# Patient Record
Sex: FEMALE | Race: WHITE | Employment: UNEMPLOYED | URBAN - METROPOLITAN AREA
[De-identification: names, ages, dates, MRNs, and addresses within clinical notes are randomized per-mention and may not be internally consistent; named-entity substitution may affect disease eponyms.]

---

## 2022-12-03 ENCOUNTER — APPOINTMENT (OUTPATIENT)
Dept: GENERAL RADIOLOGY | Age: 22
End: 2022-12-03
Attending: NURSE PRACTITIONER
Payer: MEDICAID

## 2022-12-03 ENCOUNTER — HOSPITAL ENCOUNTER (EMERGENCY)
Age: 22
Discharge: OTHER INSTITUTION | End: 2022-12-03
Attending: EMERGENCY MEDICINE
Payer: MEDICAID

## 2022-12-03 VITALS
WEIGHT: 109 LBS | TEMPERATURE: 98 F | DIASTOLIC BLOOD PRESSURE: 81 MMHG | HEART RATE: 86 BPM | RESPIRATION RATE: 16 BRPM | OXYGEN SATURATION: 96 % | SYSTOLIC BLOOD PRESSURE: 135 MMHG

## 2022-12-03 DIAGNOSIS — F31.9 BIPOLAR 1 DISORDER (HCC): Primary | ICD-10-CM

## 2022-12-03 LAB
ALBUMIN SERPL-MCNC: 4.1 G/DL (ref 3.5–5)
ALBUMIN/GLOB SERPL: 1.3 {RATIO} (ref 1.1–2.2)
ALP SERPL-CCNC: 63 U/L (ref 45–117)
ALT SERPL-CCNC: 18 U/L (ref 12–78)
AMPHET UR QL SCN: NEGATIVE
ANION GAP SERPL CALC-SCNC: 12 MMOL/L (ref 5–15)
APPEARANCE UR: CLEAR
AST SERPL-CCNC: 15 U/L (ref 15–37)
BACTERIA URNS QL MICRO: NEGATIVE /HPF
BARBITURATES UR QL SCN: NEGATIVE
BASOPHILS # BLD: 0.1 K/UL (ref 0–0.1)
BASOPHILS NFR BLD: 0 % (ref 0–1)
BENZODIAZ UR QL: NEGATIVE
BILIRUB SERPL-MCNC: 0.5 MG/DL (ref 0.2–1)
BILIRUB UR QL: NEGATIVE
BUN SERPL-MCNC: 9 MG/DL (ref 6–20)
BUN/CREAT SERPL: 14 (ref 12–20)
CALCIUM SERPL-MCNC: 9 MG/DL (ref 8.5–10.1)
CANNABINOIDS UR QL SCN: POSITIVE
CHLORIDE SERPL-SCNC: 101 MMOL/L (ref 97–108)
CO2 SERPL-SCNC: 28 MMOL/L (ref 21–32)
COCAINE UR QL SCN: NEGATIVE
COLOR UR: ABNORMAL
CREAT SERPL-MCNC: 0.63 MG/DL (ref 0.55–1.02)
DIFFERENTIAL METHOD BLD: ABNORMAL
DRUG SCRN COMMENT,DRGCM: ABNORMAL
EOSINOPHIL # BLD: 0 K/UL (ref 0–0.4)
EOSINOPHIL NFR BLD: 0 % (ref 0–7)
EPITH CASTS URNS QL MICRO: ABNORMAL /LPF
ERYTHROCYTE [DISTWIDTH] IN BLOOD BY AUTOMATED COUNT: 13.2 % (ref 11.5–14.5)
ETHANOL SERPL-MCNC: <10 MG/DL
FLUAV RNA SPEC QL NAA+PROBE: NOT DETECTED
FLUBV RNA SPEC QL NAA+PROBE: NOT DETECTED
GLOBULIN SER CALC-MCNC: 3.2 G/DL (ref 2–4)
GLUCOSE SERPL-MCNC: 91 MG/DL (ref 65–100)
GLUCOSE UR STRIP.AUTO-MCNC: NEGATIVE MG/DL
HCG UR QL: NEGATIVE
HCT VFR BLD AUTO: 36.8 % (ref 35–47)
HGB BLD-MCNC: 13 G/DL (ref 11.5–16)
HGB UR QL STRIP: NEGATIVE
IMM GRANULOCYTES # BLD AUTO: 0 K/UL (ref 0–0.04)
IMM GRANULOCYTES NFR BLD AUTO: 0 % (ref 0–0.5)
KETONES UR QL STRIP.AUTO: >80 MG/DL
LEUKOCYTE ESTERASE UR QL STRIP.AUTO: NEGATIVE
LYMPHOCYTES # BLD: 1.9 K/UL (ref 0.8–3.5)
LYMPHOCYTES NFR BLD: 16 % (ref 12–49)
MCH RBC QN AUTO: 31.5 PG (ref 26–34)
MCHC RBC AUTO-ENTMCNC: 35.3 G/DL (ref 30–36.5)
MCV RBC AUTO: 89.1 FL (ref 80–99)
METHADONE UR QL: NEGATIVE
MONOCYTES # BLD: 0.7 K/UL (ref 0–1)
MONOCYTES NFR BLD: 6 % (ref 5–13)
MUCOUS THREADS URNS QL MICRO: ABNORMAL /LPF
NEUTS SEG # BLD: 8.9 K/UL (ref 1.8–8)
NEUTS SEG NFR BLD: 78 % (ref 32–75)
NITRITE UR QL STRIP.AUTO: NEGATIVE
NRBC # BLD: 0 K/UL (ref 0–0.01)
NRBC BLD-RTO: 0 PER 100 WBC
OPIATES UR QL: NEGATIVE
PCP UR QL: NEGATIVE
PH UR STRIP: 5.5 [PH] (ref 5–8)
PLATELET # BLD AUTO: 332 K/UL (ref 150–400)
PMV BLD AUTO: 10.2 FL (ref 8.9–12.9)
POTASSIUM SERPL-SCNC: 3 MMOL/L (ref 3.5–5.1)
PROT SERPL-MCNC: 7.3 G/DL (ref 6.4–8.2)
PROT UR STRIP-MCNC: ABNORMAL MG/DL
RBC # BLD AUTO: 4.13 M/UL (ref 3.8–5.2)
RBC #/AREA URNS HPF: ABNORMAL /HPF (ref 0–5)
SARS-COV-2, COV2: NOT DETECTED
SODIUM SERPL-SCNC: 141 MMOL/L (ref 136–145)
SP GR UR REFRACTOMETRY: 1.02
UA: UC IF INDICATED,UAUC: ABNORMAL
UROBILINOGEN UR QL STRIP.AUTO: 1 EU/DL (ref 0.2–1)
WBC # BLD AUTO: 11.7 K/UL (ref 3.6–11)
WBC URNS QL MICRO: ABNORMAL /HPF (ref 0–4)

## 2022-12-03 PROCEDURE — 99284 EMERGENCY DEPT VISIT MOD MDM: CPT

## 2022-12-03 PROCEDURE — 85025 COMPLETE CBC W/AUTO DIFF WBC: CPT

## 2022-12-03 PROCEDURE — 36415 COLL VENOUS BLD VENIPUNCTURE: CPT

## 2022-12-03 PROCEDURE — 80053 COMPREHEN METABOLIC PANEL: CPT

## 2022-12-03 PROCEDURE — 81001 URINALYSIS AUTO W/SCOPE: CPT

## 2022-12-03 PROCEDURE — 87636 SARSCOV2 & INF A&B AMP PRB: CPT

## 2022-12-03 PROCEDURE — 74011250637 HC RX REV CODE- 250/637: Performed by: EMERGENCY MEDICINE

## 2022-12-03 PROCEDURE — 82077 ASSAY SPEC XCP UR&BREATH IA: CPT

## 2022-12-03 PROCEDURE — 80307 DRUG TEST PRSMV CHEM ANLYZR: CPT

## 2022-12-03 PROCEDURE — 74018 RADEX ABDOMEN 1 VIEW: CPT

## 2022-12-03 PROCEDURE — 81025 URINE PREGNANCY TEST: CPT

## 2022-12-03 RX ORDER — POTASSIUM CHLORIDE 750 MG/1
40 TABLET, FILM COATED, EXTENDED RELEASE ORAL
Status: DISCONTINUED | OUTPATIENT
Start: 2022-12-03 | End: 2022-12-04 | Stop reason: HOSPADM

## 2022-12-03 NOTE — ED NOTES
Pt presents to ED ambulatory under ECO accompanied by [place] police complaining of mental health problem. Pt denies SI/HI or AV hallucinations. Pt reports she does not know why she is here. Per CTC, pt was recently evaluated at Crisis who recommended pt go to [facility] for admission to [facility]. Pt cooperative and went to [facility] for eval but was voluntary and refused admission. Pt's family petitioned for ECO due to pt exhibiting paranoid and bizarre behavior. Per family, pt has not been sleeping for the past few days and paces around at all hours. Pt noted to have abrasions to bilateral arms, pt reports she got the scratches from her cat. Pt has hx of cutting but denies any self- injurious behavior at this time. Pt cooperative with blood draw. Pt is alert and oriented x 4, RR even and unlabored. Assessment completed and pt updated on plan of care. Call bell in reach. Emergency Department Nursing Plan of Care       The Nursing Plan of Care is developed from the Nursing assessment and Emergency Department Attending provider initial evaluation. The plan of care may be reviewed in the ED Provider note.     The Plan of Care was developed with the following considerations:   Patient / Family readiness to learn indicated by:verbalized understanding  Persons(s) to be included in education: patient  Barriers to Learning/Limitations:No    Signed     Hamzah Nunez    12/3/2022   6:24 PM

## 2022-12-03 NOTE — ED TRIAGE NOTES
Patient presents to ED in police custody under an ECO with c/o mental health crisis. [place] police sates that patient mother petitioned for a paper ECO stating that patient has not been sleeping, delusional and paranoid and standing by a neighbors car last night while neighbor was inside and just stared at [employer]. Denies SI/HI/Hallucination. Patient withdrawn when asking questions. Hx of cutting.

## 2022-12-03 NOTE — ED NOTES
Pt arrived in bilateral forensic restraints secured in front. Metal restraints sign placed on door and Micha Amaya, nursing supervisor, aware.

## 2022-12-04 NOTE — ED PROVIDER NOTES
EMERGENCY DEPARTMENT HISTORY AND PHYSICAL EXAM          Date: 12/3/2022  Patient Name: Nara Stevens    History of Presenting Illness     Chief Complaint   Patient presents with    Mental Health Problem         History Provided By: Patient    Chief Complaint: mental health   Duration:onset yesterday   Timing:  Acute  Quality: patient has been agitated   Severity: Severe  Modifying Factors: none  Associated Symptoms:  paranoid      HPI: Nara Stevens is a 25 y.o. female with a PMH of  bipolar disorder  who presents with mental health problem onset yesterday. Per parents patient has been paranoid walking outside staring into peoples cars. Patient was evaluated yesterday at [facility] and discharged. Parents today stated patient was getting worse and went to the [facility] to have an E CO issued. Patient arrived with [facility] police handcuffs and back under E CO patient stated that she knew she was here for evaluation. States she has a cut on her right wrist but her cat scratched her. Patient states she has been hearing voices \"a little bit\". She states she takes citalopram.  She also states she was recently ill and had just completed a [facility]-[employer]. States she was looking for a psychiatrist.  Patient has a vague stare and will focus on 1 object in the room and stare at it. Patient also extended her left arm and noted bruise on her arm she pulled down her pants and revealed a bruise on her left buttock. Patient states she was taking decorations out of the attic when she fell. She also states she has abdominal pain. PCP: None    Current Facility-Administered Medications   Medication Dose Route Frequency Provider Last Rate Last Admin    potassium chloride SR (KLOR-CON 10) tablet 40 mEq  40 mEq Oral NOW Kelley Khan NP           Past History     Past Medical History:  History reviewed. No pertinent past medical history. Past Surgical History:  History reviewed.  No pertinent surgical history. Family History:  History reviewed. No pertinent family history. Social History:  Social History     Tobacco Use    Smoking status: Never    Smokeless tobacco: Never   Substance Use Topics    Alcohol use: Never    Drug use: Never       Allergies:  No Known Allergies      Review of Systems   Review of Systems   Constitutional:  Negative for fatigue and fever. Respiratory:  Negative for shortness of breath and wheezing. Cardiovascular:  Negative for chest pain. Gastrointestinal:  Negative for abdominal pain. Musculoskeletal:  Negative for arthralgias and neck pain. Skin:  Negative for rash. Abrasion R wrist   Neurological:  Negative for dizziness and headaches. All other systems reviewed and are negative. Physical Exam     Vitals:    12/03/22 1642 12/03/22 1711   BP: 135/81    Pulse: 86    Resp: 16    Temp:  98 °F (36.7 °C)   SpO2: 96%    Weight:  49.4 kg (109 lb)     Physical Exam  Vitals and nursing note reviewed. Constitutional:       General: She is not in acute distress. Appearance: Normal appearance. She is well-developed. HENT:      Head: Normocephalic and atraumatic. Right Ear: External ear normal.      Left Ear: External ear normal.      Nose: Nose normal.   Eyes:      Conjunctiva/sclera: Conjunctivae normal.   Cardiovascular:      Rate and Rhythm: Normal rate and regular rhythm. Heart sounds: Normal heart sounds. Pulmonary:      Effort: Pulmonary effort is normal. No respiratory distress. Breath sounds: Normal breath sounds. No wheezing. Abdominal:      General: Bowel sounds are normal.      Palpations: Abdomen is soft. Tenderness: There is no abdominal tenderness. Musculoskeletal:         General: Normal range of motion. Cervical back: Normal range of motion and neck supple. Lymphadenopathy:      Cervical: No cervical adenopathy. Skin:     General: Skin is warm and dry. Findings: No rash.       Comments: Bruise on left buttock bruise on anterior aspect of left forearm abrasion of right wrist dorsal aspect of right hand erythema (patient states that she was hitting a punching bag without a glove)   Neurological:      Mental Status: She is alert and oriented to person, place, and time. Cranial Nerves: No cranial nerve deficit. Coordination: Coordination normal.   Psychiatric:         Mood and Affect: Mood is depressed. Speech: Speech is delayed. Behavior: Behavior is agitated. Thought Content: Thought content is paranoid. Thought content does not include homicidal or suicidal ideation. Thought content does not include homicidal or suicidal plan. Judgment: Judgment is impulsive. Medical Decision Making   I am the first provider for this patient. I reviewed the vital signs, available nursing notes, past medical history, past surgical history, family history and social history. Vital Signs-Reviewed the patient's vital signs. Records Reviewed: Nursing Notes    Provider Notes (Medical Decision Making):   70-year-old female presents under E CO agitation at home not acting herself parents went to the [facility] to obtain an E CO will order labs per protocol crisis evaluation and admission TDO  DDX disorder acute psychoses substance-induced mood disorder  ED Course as of 12/04/22 1346   Sat Dec 03, 2022   2100 Care of patient transferred to Dr Juan Mcnair [AN]   [phone] Patient is medically cleared. [AN]   [phone] Transfer Note:   Patient is being transferred to [facility]. Transfer was accepted by Dr. Skyla Romero. The reasons for their transfer have been discussed with them and available family. They convey agreement and understanding for the need to be transferred as explained to them by me.   Nicho Lemos MD     [HW]      ED Course User Index  [AN] Darline Fernando NP  [HW] Lyndsey Elena MD            Procedures:  Procedures    Diagnostic Study Results     Labs - Recent Results (from the past 12 hour(s))   CBC WITH AUTOMATED DIFF    Collection Time: 12/03/22  4:58 PM   Result Value Ref Range    WBC 11.7 (H) 3.6 - 11.0 K/uL    RBC 4.13 3.80 - 5.20 M/uL    HGB 13.0 11.5 - 16.0 g/dL    HCT 36.8 35.0 - 47.0 %    MCV 89.1 80.0 - 99.0 FL    MCH 31.5 26.0 - 34.0 PG    MCHC 35.3 30.0 - 36.5 g/dL    RDW 13.2 11.5 - 14.5 %    PLATELET 752 760 - 823 K/uL    MPV 10.2 8.9 - 12.9 FL    NRBC 0.0 0  WBC    ABSOLUTE NRBC 0.00 0.00 - 0.01 K/uL    NEUTROPHILS 78 (H) 32 - 75 %    LYMPHOCYTES 16 12 - 49 %    MONOCYTES 6 5 - 13 %    EOSINOPHILS 0 0 - 7 %    BASOPHILS 0 0 - 1 %    IMMATURE GRANULOCYTES 0 0.0 - 0.5 %    ABS. NEUTROPHILS 8.9 (H) 1.8 - 8.0 K/UL    ABS. LYMPHOCYTES 1.9 0.8 - 3.5 K/UL    ABS. MONOCYTES 0.7 0.0 - 1.0 K/UL    ABS. EOSINOPHILS 0.0 0.0 - 0.4 K/UL    ABS. BASOPHILS 0.1 0.0 - 0.1 K/UL    ABS. IMM. GRANS. 0.0 0.00 - 0.04 K/UL    DF AUTOMATED     METABOLIC PANEL, COMPREHENSIVE    Collection Time: 12/03/22  4:58 PM   Result Value Ref Range    Sodium 141 136 - 145 mmol/L    Potassium 3.0 (L) 3.5 - 5.1 mmol/L    Chloride 101 97 - 108 mmol/L    CO2 28 21 - 32 mmol/L    Anion gap 12 5 - 15 mmol/L    Glucose 91 65 - 100 mg/dL    BUN 9 6 - 20 MG/DL    Creatinine 0.63 0.55 - 1.02 MG/DL    BUN/Creatinine ratio 14 12 - 20      eGFR >60 >60 ml/min/1.73m2    Calcium 9.0 8.5 - 10.1 MG/DL    Bilirubin, total 0.5 0.2 - 1.0 MG/DL    ALT (SGPT) 18 12 - 78 U/L    AST (SGOT) 15 15 - 37 U/L    Alk.  phosphatase 63 45 - 117 U/L    Protein, total 7.3 6.4 - 8.2 g/dL    Albumin 4.1 3.5 - 5.0 g/dL    Globulin 3.2 2.0 - 4.0 g/dL    A-G Ratio 1.3 1.1 - 2.2     ETHYL ALCOHOL    Collection Time: 12/03/22  4:58 PM   Result Value Ref Range    ALCOHOL(ETHYL),SERUM <10 <10 MG/DL   COVID-19 WITH INFLUENZA A/B    Collection Time: 12/03/22  4:58 PM   Result Value Ref Range    SARS-CoV-2 by PCR Not detected NOTD      Influenza A by PCR Not detected      Influenza B by PCR Not detected     DRUG SCREEN, URINE    Collection Time: 12/03/22  6:18 PM   Result Value Ref Range    AMPHETAMINES Negative NEG      BARBITURATES Negative NEG      BENZODIAZEPINES Negative NEG      COCAINE Negative NEG      METHADONE Negative NEG      OPIATES Negative NEG      PCP(PHENCYCLIDINE) Negative NEG      THC (TH-CANNABINOL) Positive (A) NEG      Drug screen comment (NOTE)    URINALYSIS W/ REFLEX CULTURE    Collection Time: 12/03/22  6:18 PM    Specimen: Urine   Result Value Ref Range    Color DARK YELLOW      Appearance CLEAR CLEAR      Specific gravity 1.025      pH (UA) 5.5 5.0 - 8.0      Protein TRACE (A) NEG mg/dL    Glucose Negative NEG mg/dL    Ketone >80 (A) NEG mg/dL    Bilirubin Negative NEG      Blood Negative NEG      Urobilinogen 1.0 0.2 - 1.0 EU/dL    Nitrites Negative NEG      Leukocyte Esterase Negative NEG      WBC 0-4 0 - 4 /hpf    RBC 0-5 0 - 5 /hpf    Epithelial cells FEW FEW /lpf    Bacteria Negative NEG /hpf    UA:UC IF INDICATED CULTURE NOT INDICATED BY UA RESULT CNI      Mucus 2+ (A) NEG /lpf   HCG URINE, QL. - POC    Collection Time: 12/03/22  6:22 PM   Result Value Ref Range    Pregnancy test,urine (POC) Negative NEG         Radiologic Studies -   XR ABD (KUB)   Final Result   No evidence of bowel obstruction. CT Results  (Last 48 hours)      None          CXR Results  (Last 48 hours)      None                  Please note that this dictation was completed with Dragon, computer voice recognition software. Quite often unanticipated grammatical, syntax, homophones, and other interpretive errors are inadvertently transcribed by the computer software. Please disregard these errors. Additionally, please excuse any errors that have escaped final proofreading. Diagnosis     Clinical Impression:   1.  Bipolar 1 disorder ([facility] [address]

## 2022-12-04 NOTE — ED NOTES
Pt left ED ambulatory accompanied by police under TDO en route to [facility].

## 2022-12-04 NOTE — ED NOTES
Attempted to administer PO potassium, but patient refusing. Patient provided medications after she verbally agreed to take medications, but immediately put them on bedside table. When asked if she is going to take them, she pushed pills away, but shook head \"yes. \" After 30 minutes, patient has still not taken meds. Will continue to encourage patient to take medications.

## 2022-12-04 NOTE — ED NOTES
TRANSFER - OUT REPORT:    Verbal report given to Vanna Rainey RN (name) on Frances Reinoso  being transferred to [facility] (unit) at [facility] for routine progression of care       Report consisted of patients Situation, Background, Assessment and   Recommendations(SBAR). Information from the following report(s) SBAR, ED Summary, Procedure Summary, MAR and Recent Results was reviewed with the receiving nurse. Lines:       Opportunity for questions and clarification was provided.       Patient transported with:   Police

## 2022-12-05 ENCOUNTER — HOSPITAL ENCOUNTER (EMERGENCY)
Age: 22
Discharge: STILL ADMITTED OR ADMINISTRATIVE | End: 2022-12-05

## 2023-05-25 RX ORDER — TOPIRAMATE 25 MG/1
TABLET ORAL 2 TIMES DAILY
COMMUNITY

## 2023-05-25 RX ORDER — QUETIAPINE 150 MG/1
TABLET, FILM COATED, EXTENDED RELEASE ORAL
COMMUNITY

## 2023-05-25 RX ORDER — NORETHINDRONE ACETATE AND ETHINYL ESTRADIOL 1.5-30(21)
1 KIT ORAL DAILY
COMMUNITY
Start: 2015-12-07

## 2025-05-15 ENCOUNTER — HOSPITAL ENCOUNTER (INPATIENT)
Facility: HOSPITAL | Age: 25
LOS: 6 days | Discharge: HOME | DRG: 750 | End: 2025-05-22
Attending: PSYCHIATRY & NEUROLOGY | Admitting: PSYCHIATRY & NEUROLOGY
Payer: MEDICAID

## 2025-05-15 DIAGNOSIS — R45.851 SUICIDAL IDEATION: Primary | ICD-10-CM

## 2025-05-15 LAB
ALBUMIN SERPL-MCNC: 4 G/DL (ref 3.5–5)
ALBUMIN/GLOB SERPL: 1 (ref 1.1–2.2)
ALP SERPL-CCNC: 68 U/L (ref 45–117)
ALT SERPL-CCNC: 24 U/L (ref 12–78)
ANION GAP SERPL CALC-SCNC: 6 MMOL/L (ref 2–12)
APAP SERPL-MCNC: <2 UG/ML (ref 10–30)
AST SERPL W P-5'-P-CCNC: 16 U/L (ref 15–37)
BASOPHILS # BLD: 0.02 K/UL (ref 0–0.1)
BASOPHILS NFR BLD: 0.3 % (ref 0–1)
BILIRUB SERPL-MCNC: 0.5 MG/DL (ref 0.2–1)
BUN SERPL-MCNC: 11 MG/DL (ref 6–20)
BUN/CREAT SERPL: 16 (ref 12–20)
CA-I BLD-MCNC: 9.2 MG/DL (ref 8.5–10.1)
CHLORIDE SERPL-SCNC: 104 MMOL/L (ref 97–108)
CO2 SERPL-SCNC: 25 MMOL/L (ref 21–32)
CREAT SERPL-MCNC: 0.67 MG/DL (ref 0.55–1.02)
DATE LAST DOSE: ABNORMAL
DATE LAST DOSE: ABNORMAL
DIFFERENTIAL METHOD BLD: NORMAL
DOSE AMOUNT: ABNORMAL UNITS
DOSE AMOUNT: ABNORMAL UNITS
EOSINOPHIL # BLD: 0.04 K/UL (ref 0–0.4)
EOSINOPHIL NFR BLD: 0.6 % (ref 0–7)
ERYTHROCYTE [DISTWIDTH] IN BLOOD BY AUTOMATED COUNT: 11.6 % (ref 11.5–14.5)
ETHANOL SERPL-MCNC: <10 MG/DL (ref 0–0.08)
FLUAV RNA SPEC QL NAA+PROBE: NOT DETECTED
FLUBV RNA SPEC QL NAA+PROBE: NOT DETECTED
GLOBULIN SER CALC-MCNC: 3.9 G/DL (ref 2–4)
GLUCOSE SERPL-MCNC: 98 MG/DL (ref 65–100)
HCT VFR BLD AUTO: 36.1 % (ref 35–47)
HGB BLD-MCNC: 13.1 G/DL (ref 11.5–16)
IMM GRANULOCYTES # BLD AUTO: 0.02 K/UL (ref 0–0.04)
IMM GRANULOCYTES NFR BLD AUTO: 0.3 % (ref 0–0.5)
LYMPHOCYTES # BLD: 1.8 K/UL (ref 0.8–3.5)
LYMPHOCYTES NFR BLD: 25 % (ref 12–49)
MCH RBC QN AUTO: 32.6 PG (ref 26–34)
MCHC RBC AUTO-ENTMCNC: 36.3 G/DL (ref 30–36.5)
MCV RBC AUTO: 89.8 FL (ref 80–99)
MONOCYTES # BLD: 0.49 K/UL (ref 0–1)
MONOCYTES NFR BLD: 6.8 % (ref 5–13)
NEUTS SEG # BLD: 4.83 K/UL (ref 1.8–8)
NEUTS SEG NFR BLD: 67 % (ref 32–75)
NRBC # BLD: 0 K/UL (ref 0–0.01)
NRBC BLD-RTO: 0 PER 100 WBC
PLATELET # BLD AUTO: 275 K/UL (ref 150–400)
PMV BLD AUTO: 10.1 FL (ref 8.9–12.9)
POTASSIUM SERPL-SCNC: 3.3 MMOL/L (ref 3.5–5.1)
PROT SERPL-MCNC: 7.9 G/DL (ref 6.4–8.2)
RBC # BLD AUTO: 4.02 M/UL (ref 3.8–5.2)
SALICYLATES SERPL-MCNC: <1.7 MG/DL (ref 2.8–20)
SARS-COV-2 RNA RESP QL NAA+PROBE: NOT DETECTED
SODIUM SERPL-SCNC: 135 MMOL/L (ref 136–145)
WBC # BLD AUTO: 7.2 K/UL (ref 3.6–11)

## 2025-05-15 PROCEDURE — 85025 COMPLETE CBC W/AUTO DIFF WBC: CPT

## 2025-05-15 PROCEDURE — 99285 EMERGENCY DEPT VISIT HI MDM: CPT

## 2025-05-15 PROCEDURE — 82077 ASSAY SPEC XCP UR&BREATH IA: CPT

## 2025-05-15 PROCEDURE — 80143 DRUG ASSAY ACETAMINOPHEN: CPT

## 2025-05-15 PROCEDURE — 80053 COMPREHEN METABOLIC PANEL: CPT

## 2025-05-15 PROCEDURE — 36415 COLL VENOUS BLD VENIPUNCTURE: CPT

## 2025-05-15 PROCEDURE — 87636 SARSCOV2 & INF A&B AMP PRB: CPT

## 2025-05-15 PROCEDURE — 80179 DRUG ASSAY SALICYLATE: CPT

## 2025-05-15 ASSESSMENT — PAIN SCALES - GENERAL
PAINLEVEL_OUTOF10: 10
PAINLEVEL_OUTOF10: 0

## 2025-05-15 ASSESSMENT — PAIN DESCRIPTION - LOCATION: LOCATION: CHEST;HEAD

## 2025-05-15 ASSESSMENT — PAIN - FUNCTIONAL ASSESSMENT: PAIN_FUNCTIONAL_ASSESSMENT: 0-10

## 2025-05-16 PROBLEM — F29 PSYCHOSIS, UNSPECIFIED PSYCHOSIS TYPE (HCC): Status: ACTIVE | Noted: 2025-05-16

## 2025-05-16 PROBLEM — F33.9 MAJOR DEPRESSION, RECURRENT: Status: ACTIVE | Noted: 2025-05-16

## 2025-05-16 LAB
AMPHET UR QL SCN: POSITIVE
APPEARANCE UR: ABNORMAL
BACTERIA URNS QL MICRO: ABNORMAL /HPF
BARBITURATES UR QL SCN: NEGATIVE
BENZODIAZ UR QL: NEGATIVE
BILIRUB UR QL: ABNORMAL
CANNABINOIDS UR QL SCN: NEGATIVE
COCAINE UR QL SCN: NEGATIVE
COLOR UR: ABNORMAL
EPITH CASTS URNS QL MICRO: ABNORMAL /LPF
GLUCOSE UR STRIP.AUTO-MCNC: NEGATIVE MG/DL
HGB UR QL STRIP: ABNORMAL
KETONES UR QL STRIP.AUTO: 40 MG/DL
LEUKOCYTE ESTERASE UR QL STRIP.AUTO: ABNORMAL
Lab: ABNORMAL
METHADONE UR QL: NEGATIVE
MUCOUS THREADS URNS QL MICRO: ABNORMAL /LPF
NITRITE UR QL STRIP.AUTO: NEGATIVE
OPIATES UR QL: NEGATIVE
PCP UR QL: NEGATIVE
PH UR STRIP: 5 (ref 5–8)
PROT UR STRIP-MCNC: 30 MG/DL
RBC #/AREA URNS HPF: >100 /HPF (ref 0–5)
UROBILINOGEN UR QL STRIP.AUTO: NEGATIVE EU/DL (ref 0.1–1)
WBC URNS QL MICRO: ABNORMAL /HPF (ref 0–4)

## 2025-05-16 PROCEDURE — 6360000002 HC RX W HCPCS

## 2025-05-16 PROCEDURE — 6370000000 HC RX 637 (ALT 250 FOR IP)

## 2025-05-16 PROCEDURE — 96372 THER/PROPH/DIAG INJ SC/IM: CPT

## 2025-05-16 PROCEDURE — 80307 DRUG TEST PRSMV CHEM ANLYZR: CPT

## 2025-05-16 PROCEDURE — 1140000000 HC RM PRIVATE PSYCH

## 2025-05-16 PROCEDURE — 6370000000 HC RX 637 (ALT 250 FOR IP): Performed by: PSYCHIATRY & NEUROLOGY

## 2025-05-16 PROCEDURE — 81001 URINALYSIS AUTO W/SCOPE: CPT

## 2025-05-16 PROCEDURE — 90792 PSYCH DIAG EVAL W/MED SRVCS: CPT | Performed by: NURSE PRACTITIONER

## 2025-05-16 RX ORDER — QUETIAPINE FUMARATE 50 MG/1
150 TABLET, EXTENDED RELEASE ORAL DAILY
Status: DISCONTINUED | OUTPATIENT
Start: 2025-05-16 | End: 2025-05-18

## 2025-05-16 RX ORDER — ZIPRASIDONE MESYLATE 20 MG/ML
20 INJECTION, POWDER, LYOPHILIZED, FOR SOLUTION INTRAMUSCULAR EVERY 12 HOURS PRN
Status: DISCONTINUED | OUTPATIENT
Start: 2025-05-16 | End: 2025-05-22 | Stop reason: HOSPADM

## 2025-05-16 RX ORDER — ZIPRASIDONE MESYLATE 20 MG/ML
INJECTION, POWDER, LYOPHILIZED, FOR SOLUTION INTRAMUSCULAR
Status: COMPLETED
Start: 2025-05-16 | End: 2025-05-16

## 2025-05-16 RX ORDER — LORAZEPAM 1 MG/1
1 TABLET ORAL EVERY 6 HOURS PRN
Status: DISCONTINUED | OUTPATIENT
Start: 2025-05-16 | End: 2025-05-22 | Stop reason: HOSPADM

## 2025-05-16 RX ORDER — HYDROXYZINE HYDROCHLORIDE 25 MG/1
50 TABLET, FILM COATED ORAL ONCE
Status: COMPLETED | OUTPATIENT
Start: 2025-05-16 | End: 2025-05-16

## 2025-05-16 RX ORDER — BUPROPION HYDROCHLORIDE 150 MG/1
150 TABLET ORAL EVERY MORNING
Status: DISCONTINUED | OUTPATIENT
Start: 2025-05-17 | End: 2025-05-22 | Stop reason: HOSPADM

## 2025-05-16 RX ORDER — MIRTAZAPINE 7.5 MG/1
7.5 TABLET, FILM COATED ORAL NIGHTLY
Status: ON HOLD | COMMUNITY
Start: 2025-04-17 | End: 2025-05-22

## 2025-05-16 RX ORDER — MIRTAZAPINE 15 MG/1
7.5 TABLET, FILM COATED ORAL NIGHTLY
Status: DISCONTINUED | OUTPATIENT
Start: 2025-05-16 | End: 2025-05-22 | Stop reason: HOSPADM

## 2025-05-16 RX ORDER — HYDROXYZINE HYDROCHLORIDE 50 MG/1
50 TABLET, FILM COATED ORAL 3 TIMES DAILY PRN
Status: DISCONTINUED | OUTPATIENT
Start: 2025-05-16 | End: 2025-05-22 | Stop reason: HOSPADM

## 2025-05-16 RX ORDER — TRAZODONE HYDROCHLORIDE 50 MG/1
50 TABLET ORAL NIGHTLY PRN
Status: DISCONTINUED | OUTPATIENT
Start: 2025-05-16 | End: 2025-05-22 | Stop reason: HOSPADM

## 2025-05-16 RX ORDER — LORAZEPAM 2 MG/ML
1 INJECTION INTRAMUSCULAR EVERY 6 HOURS PRN
Status: DISCONTINUED | OUTPATIENT
Start: 2025-05-16 | End: 2025-05-22 | Stop reason: HOSPADM

## 2025-05-16 RX ORDER — HYDROXYZINE HYDROCHLORIDE 25 MG/1
25 TABLET, FILM COATED ORAL EVERY 8 HOURS PRN
Status: ON HOLD | COMMUNITY
Start: 2025-04-17 | End: 2025-05-22 | Stop reason: HOSPADM

## 2025-05-16 RX ORDER — CITALOPRAM HYDROBROMIDE 20 MG/1
20 TABLET ORAL DAILY
Status: ON HOLD | COMMUNITY
End: 2025-05-22 | Stop reason: HOSPADM

## 2025-05-16 RX ORDER — ZIPRASIDONE MESYLATE 20 MG/ML
20 INJECTION, POWDER, LYOPHILIZED, FOR SOLUTION INTRAMUSCULAR ONCE
Status: COMPLETED | OUTPATIENT
Start: 2025-05-16 | End: 2025-05-16

## 2025-05-16 RX ORDER — ACETAMINOPHEN 325 MG/1
650 TABLET ORAL EVERY 4 HOURS PRN
Status: DISCONTINUED | OUTPATIENT
Start: 2025-05-16 | End: 2025-05-22 | Stop reason: HOSPADM

## 2025-05-16 RX ORDER — BUPROPION HYDROCHLORIDE 150 MG/1
150 TABLET ORAL EVERY MORNING
Status: ON HOLD | COMMUNITY
Start: 2025-04-17 | End: 2025-05-22 | Stop reason: HOSPADM

## 2025-05-16 RX ORDER — ZIPRASIDONE HYDROCHLORIDE 40 MG/1
40 CAPSULE ORAL 2 TIMES DAILY PRN
Status: DISCONTINUED | OUTPATIENT
Start: 2025-05-16 | End: 2025-05-22 | Stop reason: HOSPADM

## 2025-05-16 RX ADMIN — HYDROXYZINE HYDROCHLORIDE 50 MG: 25 TABLET, FILM COATED ORAL at 02:56

## 2025-05-16 RX ADMIN — QUETIAPINE FUMARATE 150 MG: 50 TABLET, EXTENDED RELEASE ORAL at 16:34

## 2025-05-16 RX ADMIN — ZIPRASIDONE MESYLATE 20 MG: 20 INJECTION, POWDER, LYOPHILIZED, FOR SOLUTION INTRAMUSCULAR at 05:02

## 2025-05-16 RX ADMIN — HYDROXYZINE HYDROCHLORIDE 50 MG: 50 TABLET, FILM COATED ORAL at 21:08

## 2025-05-16 RX ADMIN — MIRTAZAPINE 7.5 MG: 15 TABLET, FILM COATED ORAL at 21:07

## 2025-05-16 RX ADMIN — TRAZODONE HYDROCHLORIDE 50 MG: 50 TABLET ORAL at 21:08

## 2025-05-16 ASSESSMENT — LIFESTYLE VARIABLES
HOW OFTEN DO YOU HAVE A DRINK CONTAINING ALCOHOL: 4 OR MORE TIMES A WEEK
HOW MANY STANDARD DRINKS CONTAINING ALCOHOL DO YOU HAVE ON A TYPICAL DAY: 1 OR 2

## 2025-05-16 ASSESSMENT — SLEEP AND FATIGUE QUESTIONNAIRES
AVERAGE NUMBER OF SLEEP HOURS: 5
DO YOU HAVE DIFFICULTY SLEEPING: NO
DO YOU USE A SLEEP AID: YES

## 2025-05-16 ASSESSMENT — PAIN SCALES - GENERAL
PAINLEVEL_OUTOF10: 0
PAINLEVEL_OUTOF10: 0

## 2025-05-16 NOTE — ED PROVIDER NOTES
Tb24 extended release tablet  Commonly known as: SEROQUEL XR     topiramate 25 MG tablet  Commonly known as: TOPAMAX                DISCONTINUED MEDICATIONS:  Current Discharge Medication List          I am the Primary Clinician of Record. Tracey Caldera PA-C (electronically signed)    (Please note that parts of this dictation were completed with voice recognition software. Quite often unanticipated grammatical, syntax, homophones, and other interpretive errors are inadvertently transcribed by the computer software. Please disregards these errors. Please excuse any errors that have escaped final proofreading.)     Tracey Caldera PA-C  05/17/25 0045

## 2025-05-16 NOTE — ED NOTES
Pt agreeable with patient talking with Tamika, prior foster mother.  Tamika called at this time, phone number [phone]. Provided an update at this time. Informed Tamika that information should be relayed to significant other who is requesting an update. Writer requested medication list at this time. Tamika informed writer that patient has been taking Trazadone 100 mg tablet at bedtime which is a old medication that she had, however, was prescribed other medications that she did not like so did not take  Other medications include  Mirtazipine 7.5 mg tablet once at bedtime  Citalopram 20 mg once daily  Bupropion 150 mg once daily  Hydroxyzine 25 mg 3 times daily as needed for anxiety

## 2025-05-16 NOTE — BSMART NOTE
Pt presents under paperless ECO initiated by [place] Police. This writer contacted [place] [facility] for further instructions. Awaiting call back.

## 2025-05-16 NOTE — ED NOTES
Writer observed pt starring off in room and responding to internal stimuli. While medicating patient, pt seemed paranoid, stating \"Please don't change the medicine after I take it, I'm trusting you.\"    Pt has been verbally abusive towards some staff and PD, but cooperative and calm with writer.     Still trying to obtained urine sample at this time, pt has been escorted to bathroom multiple times with staff and PD.     Pt resting on stretcher, no distressed noted. PD cuff in place on pt's right wrist. Safety sitter and PD at door.

## 2025-05-16 NOTE — ED NOTES
Assumed care of patient. Report received from Kaykay SANON RN. Pt sitting in bed, talking to self.  and [place] PD present with line of sight of patient. Forensic restraint present to left wrist at this time.

## 2025-05-16 NOTE — VIRTUAL HEALTH
out the details of how to kill yourself? Do you intend to carry out this plan?  No   6) Have you ever done anything, started to do anything, or prepared to do anything to end your life? Yes   Did this occur within the past 3 months?  No    :    Protective Factors:  Protective: Age >25 and <55, Female gender, and Physically healthy  Risk Factors:  Risk Factors: Active suicidal ideation, Access to lethal means, Prior suicide attempt, Active substance abuse, Highly impulsive behaviors, Active psychosis, Medication noncompliance, and No collateral information to support safety      Overall Level Suicide Risk: TelePsych CSSRS Risk Level: Moderate Risk to high risk    Assessment:   Patient is a 25 y.o.  female who presents for suicidal ideation. Patient was exhibiting bizarre behavior and when police arrived she made a suicidal statement. Patient is exhibiting psychotic symptoms. She appears to be minimizing SI. She has been noncompliant with outpatient treatment. She is currently on a TDO awaiting transfer to [facility] for safety and stabilization.    Dx:   Unspecified psychosis  Unspecified mood disorder  Suicidal ideation    Plan:  Inpatient psychiatric admission at appropriate care level facility, once medically cleared and stable  Legal Status: INVOLUNTARY.    Risk of elopement  Medical co-morbidities: Management per medical providers, appreciate assistance.  Medication Recommendations:  Per inpatient providers  Reviewed treatment plan with patient including risks, benefits, alternatives, and side effects of medications, and any/all black box warnings. Patient verbalized understanding.  Patient had an opportunity to ask questions and address concerns. Obtained informed consent for treatment.  Medical records, labs, and diagnostic tests reviewed.   Re-consult for any new changes or concerns. Thank you for this consult.  Discussed recommendations with Dr. Frank at time of consult

## 2025-05-16 NOTE — ED NOTES
Manjeet Ty called writer at this time requesting information regarding patient, name not present on chart under patient contacts. No information provided to Mr. Ty at this time.

## 2025-05-16 NOTE — ED NOTES
Tamika called at this time, reports that she is patients prior foster mother and pt currently lives with her, requesting update. Name not present on patient chart, unable to provide information at this time. Pt asked if she would like to speak with Tamika, pt agreeable, states that she \"will talk to her, that's fine\"  0952: Pt hung up on Tamika, phone back to writer at this time

## 2025-05-16 NOTE — ED NOTES
Pt resting quietly with eyes closed at this time. Equal chest rise and fall noted.  and [place] Present with line of sight of patient.

## 2025-05-16 NOTE — ED NOTES
[facility] called writer at this time. Spoke with Kayla. Informed writer that pt has been accepted to [facility] by Dr Choi  Phone number for nurse to nurse: [phone]  Informed to call report in 30 minutes

## 2025-05-16 NOTE — BSMART NOTE
Per Christine with [facility], pt was prescreened in the community and they are petitioning for a TDO.

## 2025-05-16 NOTE — ED NOTES
Pt became verbally aggressive with writer and staff. Pt began cussing and screaming at staff. Provider aware.

## 2025-05-16 NOTE — ED NOTES
Attempted to call parent numbers listed on file at this time to complete home med list review, however, first number not going through and second number is [facility].     0834: Attempted to call Sujey Ty at [phone], call went through at this time, however, no answer. VM left with call back number.

## 2025-05-16 NOTE — ED NOTES
Pt changed into green gown and wanded by security. Room psych safe. Belongings placed in locker number 26 at nursing station 3. Safety sitter and [place] PD at door.  PD handcuff on right wrist, cuffed to stretcher.

## 2025-05-16 NOTE — ED TRIAGE NOTES
Per Police: law enforcement called for the patient's behavior being bizarre    Reported that patient's \"foster mother\" and boyfriend stated that the patient has not been acting appropriate and making unusual statements    Reported when police arrived, patient stated she wanted to kill herself, no plan    Reported that patient was resisting to get into the police car    Patient redirectable with police at this time    ++SI with no plan  +flight of ideas  irritable

## 2025-05-16 NOTE — ED NOTES
Report called to [facility] at this time. Report provided to Ana Rosa Atowod RN. Questions answered.

## 2025-05-16 NOTE — GROUP NOTE
Group Therapy Note    Date: 5/16/2025    Group Start Time: 1600  Group End Time: 1645  Group Topic: Recreational    SSR 2  NON ACUTE    Stephanie Rosales        Group Therapy Note    Facilitated leisure skills group to reinforce positive coping and to manage mood through music, social interaction, group activities and art task       Attendees: 6/10       Patient's Goal:  Attend group daily     Notes:  Pt attended group after admission process. Pt was receptive to listening to music while working on leisure task. Interacted with peer and staff. Accepted a journal and leisure packet    Status After Intervention:  Unchanged    Participation Level: Active Listener and Interactive    Participation Quality: Appropriate and Attentive      Speech:  normal      Thought Process/Content: Logical      Affective Functioning: Congruent      Mood:  calm      Level of consciousness:  Attentive      Response to Learning: Progressing to goal      Endings: None Reported    Modes of Intervention: Socialization and Activity      Discipline Responsible: Recreational Therapist      Signature:  DANITZA Arreaga

## 2025-05-16 NOTE — ED NOTES
Following report being called to [facility] and EMTALA being completed, writer informed that patient will remain at [facility] and be accepted to  [address]. Awaiting second change of facility form at this time.

## 2025-05-17 PROCEDURE — 6370000000 HC RX 637 (ALT 250 FOR IP): Performed by: NURSE PRACTITIONER

## 2025-05-17 PROCEDURE — 6370000000 HC RX 637 (ALT 250 FOR IP): Performed by: PSYCHIATRY & NEUROLOGY

## 2025-05-17 PROCEDURE — 1140000000 HC RM PRIVATE PSYCH

## 2025-05-17 RX ORDER — SULFAMETHOXAZOLE AND TRIMETHOPRIM 800; 160 MG/1; MG/1
1 TABLET ORAL ONCE
Status: DISCONTINUED | OUTPATIENT
Start: 2025-05-17 | End: 2025-05-22 | Stop reason: HOSPADM

## 2025-05-17 RX ORDER — POTASSIUM CHLORIDE 1500 MG/1
40 TABLET, EXTENDED RELEASE ORAL ONCE
Status: DISCONTINUED | OUTPATIENT
Start: 2025-05-17 | End: 2025-05-22 | Stop reason: HOSPADM

## 2025-05-17 RX ADMIN — BUPROPION HYDROCHLORIDE 150 MG: 150 TABLET, EXTENDED RELEASE ORAL at 10:21

## 2025-05-17 RX ADMIN — QUETIAPINE FUMARATE 150 MG: 50 TABLET, EXTENDED RELEASE ORAL at 10:21

## 2025-05-17 ASSESSMENT — PAIN - FUNCTIONAL ASSESSMENT: PAIN_FUNCTIONAL_ASSESSMENT: NONE - DENIES PAIN

## 2025-05-17 NOTE — CARE COORDINATION
05/17/25 1849   ITP   Date of Plan 05/17/25   Date of Next Review 05/24/25   Primary Diagnosis Code Principal Psychosis, unspecified psychosis type (HCC) F29   Barriers to Treatment Client resistance;Need for psychoeducation;Psychiatric symptom (comment)  (lacks insight, non med compliant, disorganized)   Strengths Incorporated in Plan Family supports;Community supports   Plan of Care   Long Term Goal (LTG) Stated in patient/guardian terms Pt will actively participate in treatment   Short Term Goal 1   Short Term Goal 1 Client will be oriented to program and staff, and participate in assessment process   Baseline Functioning pt is disorganized and not participating in treatment   Target pt will actively participate by medication management and individual/group therapy   Objectives Client will participate in individual therapy;Client will participate in group therapy;Other (comment)  (med management)   Intervention  Monitor medications   Frequency daily   Measured by Staff observation;Self report   Staff Responsible Clinical staff   Intervention 2 Indvidual therapy   Frequency daily   Measured by Behavioral data;Self report;Staff observation   Staff Responsible [facility] staff   Intervention 3 Group therapy   Frequency daily   Measured by Behavioral data;Self report;Staff observation   Staff Responsible [facility] staff;Clinical staff   STG Goal 1 Status: Patient Appears to be  Treatment plan goal is unmet at this time   Short Term Goal 2   Short Term Goal 2 Client will maintain compliance with medication regime   Baseline Functioning pt is not taking medications   Target pt will take medications PO and report the theraputic effect   Objectives Other (comment)  (med management)   Intervention  Monitor medications   Frequency daily   Measured by Self report;Staff observation   Staff Responsible Clinical staff   Intervention 2 Indvidual therapy   Frequency daily   Measured by Behavioral data;Self report;Staff observation   Staff

## 2025-05-17 NOTE — H&P
MD    buPROPion (WELLBUTRIN XL) extended release tablet 150 mg, 150 mg, Oral, QAM, Carmen Fong MD, 150 mg at 05/17/25 1021    mirtazapine (REMERON) tablet 7.5 mg, 7.5 mg, Oral, Nightly, Carmen Fong MD, 7.5 mg at 05/16/25 2107    QUEtiapine (SEROQUEL XR) extended release tablet 150 mg, 150 mg, Oral, Daily, Carmen Fong MD, 150 mg at 05/17/25 1021     ESTIMATED LENGTH OF STAY:    5-7 days                              SIGNED:    Carmen Fong MD  5/17/2025

## 2025-05-17 NOTE — GROUP NOTE
Group Therapy Note    Date: 5/17/2025    Group Start Time: 1055  Group End Time: 1130  Group Topic: Education Group - Inpatient    SSR 2  NON ACUTE    Stephanie Rosales        Group Therapy Note    Facilitated group to discuss definition and examples of healthy vs unhealthy coping strategies. Introduced examples of unhealthy scenarios to help identify consequences from using unhealthy strategies and recognizing healthy coping strategies that would be helpful and identifying barriers that may prevent using healthy coping strategies       Attendees: 8/11       Patient's Goal:  Attend group daily    Notes:  Receptive to information discussed and engaged in discussion.  Pt was able to recognize examples of healthy and unhealthy coping strategies. Shared a personal example when she was coping in an unhealthy way and identified a consequence. Shared a healthy coping strategy that would be more helpful. Left group. Did not return        Status After Intervention:  Unchanged    Participation Level: Active Listener and Interactive    Participation Quality: Appropriate and Sharing      Speech:  normal      Thought Process/Content: Logical      Affective Functioning: Congruent      Mood:  calm      Level of consciousness:  Alert and Preoccupied      Response to Learning: Able to verbalize current knowledge/experience and Progressing to goal      Endings: None Reported    Modes of Intervention: Education and Support      Discipline Responsible: Recreational Therapist      Signature:  DANITZA Arreaga

## 2025-05-17 NOTE — CONSULTS
Hospitalist Admission Note    NAME: Sharif Mcnair   :  2000   MRN:  [id number]     Date/Time:  2025 9:12 AM    Patient PCP: No primary care provider on file.    ______________________________________________________________________  Given the patient's current clinical presentation, I have a high level of concern for decompensation if discharged from the emergency department.  Complex decision making was performed, which includes reviewing the patient's available past medical records, laboratory results, and x-ray films.       My assessment of this patient's clinical condition and my plan of care is as follows.    Assessment / Plan:  Disorganized thoughts, bizarre behavior, agitation, suicidal ideation without plan  - Management per primary team    Hypokalemia  - Potassium 3.3 on admission labs  - Patient refused oral potassium repletion and repeat lab draw    UTI/pyuria on admission urinalysis  - Started p.o. Bactrim however patient refused  - Patient denies symptoms of UTI currently    Complaints of back pain/soreness  - Continue as needed Tylenol    Patient has no significant past medical history, denies take any prescribed or OTC medications for medical diagnoses, currently refusing treatment and repeat labs for hypokalemia and UTI.  No indication for admission to medicine service at this time, hospital medicine will sign off.  Please call or reconsult with any questions or concerns.    Medical Decision Making:   I personally reviewed labs: BMP, urine toxicology, CBC with differential, urinalysis  I personally reviewed imaging: None  Toxic drug monitoring: None      Code Status: Full code  DVT Prophylaxis: Per primary team  GI Prophylaxis: Per primary team      Subjective:   CHIEF COMPLAINT: Agitation, bizarre behavior, suicidal ideation without plan    HISTORY OF PRESENT ILLNESS:     Sharif Mcnair is a 25 y.o.  female with past medical history of bipolar disorder who presented to the ED with

## 2025-05-17 NOTE — GROUP NOTE
Group Therapy Note    Date: 5/17/2025    Group Start Time: 1545  Group End Time: 1635  Group Topic: Recreational    SSR 2 BH NON ACUTE    Stephanie Rosales        Group Therapy Note    Facilitated leisure skills group to reinforce positive coping and to manage mood through music, social interaction, group activities and art task       Attendees: 6/10      Notes:  Encouraged but did not attend    Discipline Responsible: Recreational Therapist      Signature:  DANITZA Arreaga

## 2025-05-18 PROCEDURE — 6370000000 HC RX 637 (ALT 250 FOR IP): Performed by: PSYCHIATRY & NEUROLOGY

## 2025-05-18 PROCEDURE — 1140000000 HC RM PRIVATE PSYCH

## 2025-05-18 RX ORDER — QUETIAPINE 150 MG/1
300 TABLET, FILM COATED, EXTENDED RELEASE ORAL EVERY EVENING
Status: DISCONTINUED | OUTPATIENT
Start: 2025-05-18 | End: 2025-05-22 | Stop reason: HOSPADM

## 2025-05-18 RX ADMIN — MIRTAZAPINE 7.5 MG: 15 TABLET, FILM COATED ORAL at 21:02

## 2025-05-18 RX ADMIN — QUETIAPINE FUMARATE 300 MG: 150 TABLET, EXTENDED RELEASE ORAL at 21:01

## 2025-05-18 RX ADMIN — QUETIAPINE FUMARATE 150 MG: 50 TABLET, EXTENDED RELEASE ORAL at 09:27

## 2025-05-18 RX ADMIN — BUPROPION HYDROCHLORIDE 150 MG: 150 TABLET, EXTENDED RELEASE ORAL at 09:27

## 2025-05-18 ASSESSMENT — PAIN - FUNCTIONAL ASSESSMENT: PAIN_FUNCTIONAL_ASSESSMENT: NONE - DENIES PAIN

## 2025-05-18 NOTE — GROUP NOTE
Group Therapy Note    Date: 5/18/2025    Group Start Time: 1100  Group End Time: 1145  Group Topic: Education Group - Inpatient    SSR 2  NON ACUTE    Stephanie Rosales        Group Therapy Note    Facilitated group to introduce information on the “benefits of having a positive mental attitude and how it correlates with self-esteem”       Attendees: 4/11       Notes:  Encouraged but did not attend    Discipline Responsible: Recreational Therapist      Signature:  DANITZA Arreaga

## 2025-05-18 NOTE — GROUP NOTE
Group Therapy Note    Date: 5/18/2025    Group Start Time: 1605  Group End Time: 1645  Group Topic: Recreational    SSR 2 BH NON ACUTE    Stephanie Rosales        Group Therapy Note    Facilitated leisure skills group to reinforce positive coping and to manage mood through music, social interaction, group activities and art task      Attendees:  3/10       Notes:  Encouraged but did not attend    Discipline Responsible: Recreational Therapist      Signature:  DANITZA Arreaga

## 2025-05-18 NOTE — PROGRESS NOTES
Progress Note  Date:2025       Room:[facility]  Patient Name:Sharif Mcnair     YOB: 2000     Age:25 y.o.        Subjective    Subjective   Patient has been mostly isolated to herself, did not engage this morning in conversation.  As per review of chart had declined Remeron previous night. Noted she has been still hearing voices.  No acute distress noted    Mental status examination-patient in bed resting at this time did not engage in conversation did not wake up when called.  No acute distress noted as per review of chart patient still hearing voices which are loud.  No reported active suicidal or homicidal statements no reported active command hallucinations to harm self or others.  Insight judgment coping remains impaired.  Review of Systems  Objective         Vitals Last 24 Hours:  TEMPERATURE:  Temp  Av.8 °F (37.1 °C)  Min: 98.8 °F (37.1 °C)  Max: 98.8 °F (37.1 °C)  RESPIRATIONS RANGE: Resp  Av  Min: 18  Max: 18  PULSE OXIMETRY RANGE: SpO2  Av %  Min: 97 %  Max: 97 %  PULSE RANGE: Pulse  Av  Min: 73  Max: 73  BLOOD PRESSURE RANGE: Systolic (24hrs), Av , Min:107 , Max:107   ; Diastolic (24hrs), Av, Min:63, Max:63    I/O (24Hr):  No intake or output data in the 24 hours ending 25 1203  Objective  Labs/Imaging/Diagnostics    Labs:  CBC:  Recent Labs     05/15/25  2132   WBC 7.2   RBC 4.02   HGB 13.1   HCT 36.1   MCV 89.8   RDW 11.6        CHEMISTRIES:  Recent Labs     05/15/25  2132   *   K 3.3*      CO2 25   BUN 11   CREATININE 0.67   GLUCOSE 98     PT/INR:No results for input(s): \"PROTIME\", \"INR\" in the last 72 hours.  APTT:No results for input(s): \"APTT\" in the last 72 hours.  LIVER PROFILE:  Recent Labs     05/15/25  2132   AST 16   ALT 24   BILITOT 0.5   ALKPHOS 68       Imaging Last 24 Hours:  No results found.  Assessment//Plan           Hospital Problems           Last Modified POA    * (Principal) Psychosis, unspecified psychosis type

## 2025-05-19 PROCEDURE — 6370000000 HC RX 637 (ALT 250 FOR IP): Performed by: PSYCHIATRY & NEUROLOGY

## 2025-05-19 PROCEDURE — 1140000000 HC RM PRIVATE PSYCH

## 2025-05-19 RX ADMIN — TRAZODONE HYDROCHLORIDE 50 MG: 50 TABLET ORAL at 21:05

## 2025-05-19 RX ADMIN — MIRTAZAPINE 7.5 MG: 15 TABLET, FILM COATED ORAL at 21:05

## 2025-05-19 RX ADMIN — HYDROXYZINE HYDROCHLORIDE 50 MG: 50 TABLET, FILM COATED ORAL at 21:05

## 2025-05-19 RX ADMIN — QUETIAPINE FUMARATE 300 MG: 150 TABLET, EXTENDED RELEASE ORAL at 17:05

## 2025-05-19 RX ADMIN — BUPROPION HYDROCHLORIDE 150 MG: 150 TABLET, EXTENDED RELEASE ORAL at 08:21

## 2025-05-19 NOTE — GROUP NOTE
Group Therapy Note    Date: 5/19/2025    Group Start Time: 1055  Group End Time: 1130  Group Topic: Education Group - Inpatient    SSR 2  NON ACUTE    Stephanie Rosales        Group Therapy Note    Facilitated group to focus on “exploring values “and “utilized values discussion questions      Attendees: 3/9      Notes:  Encouraged but did not attend    Discipline Responsible: Recreational Therapist      Signature:  DANITZA Arreaga

## 2025-05-19 NOTE — GROUP NOTE
Group Therapy Note    Date: 5/19/2025    Group Start Time: 1550  Group End Time: 1630  Group Topic: Recreational    SSR 2 BH NON ACUTE    Stephanie Rosales        Group Therapy Note    Facilitated leisure skills group to reinforce positive coping and to manage mood through music, social interaction, group activities and art task       Attendees: 6/10      Notes:  Encouraged but did not attend    Discipline Responsible: Recreational Therapist      Signature:  DANITZA Arreaga

## 2025-05-20 PROCEDURE — 6370000000 HC RX 637 (ALT 250 FOR IP): Performed by: PSYCHIATRY & NEUROLOGY

## 2025-05-20 PROCEDURE — 1140000000 HC RM PRIVATE PSYCH

## 2025-05-20 RX ADMIN — MIRTAZAPINE 7.5 MG: 15 TABLET, FILM COATED ORAL at 21:23

## 2025-05-20 RX ADMIN — QUETIAPINE FUMARATE 300 MG: 150 TABLET, EXTENDED RELEASE ORAL at 17:55

## 2025-05-20 RX ADMIN — TRAZODONE HYDROCHLORIDE 50 MG: 50 TABLET ORAL at 21:24

## 2025-05-20 RX ADMIN — BUPROPION HYDROCHLORIDE 150 MG: 150 TABLET, EXTENDED RELEASE ORAL at 09:46

## 2025-05-20 RX ADMIN — HYDROXYZINE HYDROCHLORIDE 50 MG: 50 TABLET, FILM COATED ORAL at 21:22

## 2025-05-20 NOTE — PROGRESS NOTES
Progress Note  Date:2025       Room:[facility] [place]  Patient Name:Sharif Mcnair     YOB: 2000     Age:25 y.o.        Subjective    Subjective patient has had TDO hearing.  And was committed for up to 10 days.  Patient expresses that this morning that she is ready to be discharged and does not need to be here.  Patient was provided the discussion of compliance with treatment and to engage in therapy.    Patient has been mostly isolated to herself, did not engage this morning in conversation.  Patient remains guarded, paranoid hearing voices but minimizes.    Mental status examination-patient in bed resting at this time did not engage in conversation did not wake up when called.  No acute distress noted as per review of chart patient still hearing voices which are loud.  No reported active suicidal or homicidal statements no reported active command hallucinations to harm self or others.  Insight judgment coping remains impaired.  Review of Systems  Objective         Vitals Last 24 Hours:  TEMPERATURE:  Temp  Av.5 °F (36.9 °C)  Min: 98.1 °F (36.7 °C)  Max: 98.8 °F (37.1 °C)  RESPIRATIONS RANGE: Resp  Av  Min: 16  Max: 20  PULSE OXIMETRY RANGE: SpO2  Av.5 %  Min: 96 %  Max: 99 %  PULSE RANGE: Pulse  Av.5  Min: 65  Max: 86  BLOOD PRESSURE RANGE: Systolic (24hrs), Av , Min:101 , Max:110   ; Diastolic (24hrs), Av, Min:59, Max:69    I/O (24Hr):  No intake or output data in the 24 hours ending 25  Objective:  Vital signs: (most recent): Blood pressure (!) 110/59, pulse 65, temperature 98.1 °F (36.7 °C), temperature source Oral, resp. rate 20, height 1.626 m (5' 4\"), weight 68 kg (150 lb), last menstrual period 05/15/2025, SpO2 99%.      Labs/Imaging/Diagnostics    Labs:  CBC:  No results for input(s): \"WBC\", \"RBC\", \"HGB\", \"HCT\", \"MCV\", \"RDW\", \"PLT\" in the last 72 hours.    CHEMISTRIES:  No results for input(s): \"NA\", \"K\", \"CL\", \"CO2\", \"BUN\", \"CREATININE\", \"GLUCOSE\",

## 2025-05-21 PROCEDURE — 1140000000 HC RM PRIVATE PSYCH

## 2025-05-21 PROCEDURE — 6370000000 HC RX 637 (ALT 250 FOR IP): Performed by: PSYCHIATRY & NEUROLOGY

## 2025-05-21 RX ADMIN — HYDROXYZINE HYDROCHLORIDE 50 MG: 50 TABLET, FILM COATED ORAL at 21:23

## 2025-05-21 RX ADMIN — TRAZODONE HYDROCHLORIDE 50 MG: 50 TABLET ORAL at 21:23

## 2025-05-21 RX ADMIN — BUPROPION HYDROCHLORIDE 150 MG: 150 TABLET, EXTENDED RELEASE ORAL at 08:22

## 2025-05-21 RX ADMIN — MIRTAZAPINE 7.5 MG: 15 TABLET, FILM COATED ORAL at 21:23

## 2025-05-21 RX ADMIN — QUETIAPINE FUMARATE 300 MG: 150 TABLET, EXTENDED RELEASE ORAL at 18:19

## 2025-05-21 NOTE — GROUP NOTE
Group Therapy Note    Date: 5/21/2025    Group Start Time: 1315  Group End Time: 1400  Group Topic: Process Group - Inpatient    SSR 2 [facility] ACUTE    Karen Gaines MSW        Group Therapy Note: Facilitator encouraged the group to share something about themselves and finish the sentence, \"I'm in the hospital because...\" Utilized group check in topics and had pts respond.     Attendees: 5       Patient's Goal:  Pt did not attend group.      Signature:  DONY PATEL

## 2025-05-21 NOTE — GROUP NOTE
Group Therapy Note    Date: 5/20/2025    Group Start Time: 1935  Group End Time: 2030  Group Topic: Recreational    SSR 2 BH NON ACUTE    Elana Fang        Group Therapy Note    Attendees: 8/12    Recreational Therapist facilitated structured leisure skills group to introduce healthy leisure skills as positive way to cope and manage mood.         Patient's Goal:  Client will be oriented to program and staff, and participate in assessment process     Notes:  Initially attended group. Listened to songs selected by peers. Pt left group early and did not return.     Status After Intervention:  Improved    Participation Level: Active Listener    Participation Quality: Appropriate      Speech:  normal      Thought Process/Content: Logical      Affective Functioning: Congruent      Mood:  calm       Level of consciousness:  Alert      Response to Learning: Progressing to goal      Endings: None Reported    Modes of Intervention: Activity      Discipline Responsible: Recreational Therapist      Signature:  DANITZA Yanes

## 2025-05-22 VITALS
TEMPERATURE: 97.5 F | BODY MASS INDEX: 25.61 KG/M2 | RESPIRATION RATE: 16 BRPM | WEIGHT: 150 LBS | SYSTOLIC BLOOD PRESSURE: 112 MMHG | DIASTOLIC BLOOD PRESSURE: 62 MMHG | OXYGEN SATURATION: 98 % | HEART RATE: 68 BPM | HEIGHT: 64 IN

## 2025-05-22 PROCEDURE — 6370000000 HC RX 637 (ALT 250 FOR IP): Performed by: PSYCHIATRY & NEUROLOGY

## 2025-05-22 RX ORDER — TRAZODONE HYDROCHLORIDE 50 MG/1
50 TABLET ORAL NIGHTLY PRN
Qty: 30 TABLET | Refills: 1 | Status: SHIPPED | OUTPATIENT
Start: 2025-05-22 | End: 2025-05-22

## 2025-05-22 RX ORDER — HYDROXYZINE HYDROCHLORIDE 50 MG/1
50 TABLET, FILM COATED ORAL 3 TIMES DAILY PRN
Qty: 30 TABLET | Refills: 1 | Status: SHIPPED | OUTPATIENT
Start: 2025-05-22 | End: 2025-06-11

## 2025-05-22 RX ORDER — MIRTAZAPINE 7.5 MG/1
7.5 TABLET, FILM COATED ORAL NIGHTLY
Qty: 30 TABLET | Refills: 1 | Status: SHIPPED | OUTPATIENT
Start: 2025-05-22

## 2025-05-22 RX ORDER — MIRTAZAPINE 7.5 MG/1
7.5 TABLET, FILM COATED ORAL NIGHTLY
Qty: 30 TABLET | Refills: 1 | Status: SHIPPED | OUTPATIENT
Start: 2025-05-22 | End: 2025-05-22

## 2025-05-22 RX ORDER — HYDROXYZINE HYDROCHLORIDE 50 MG/1
50 TABLET, FILM COATED ORAL 3 TIMES DAILY PRN
Qty: 30 TABLET | Refills: 1 | Status: SHIPPED | OUTPATIENT
Start: 2025-05-22 | End: 2025-05-22

## 2025-05-22 RX ORDER — TRAZODONE HYDROCHLORIDE 50 MG/1
50 TABLET ORAL NIGHTLY PRN
Qty: 30 TABLET | Refills: 1 | Status: SHIPPED | OUTPATIENT
Start: 2025-05-22

## 2025-05-22 RX ORDER — TOPIRAMATE 25 MG/1
25 TABLET, FILM COATED ORAL 2 TIMES DAILY
Qty: 60 TABLET | Refills: 1 | Status: SHIPPED | OUTPATIENT
Start: 2025-05-22

## 2025-05-22 RX ORDER — BUPROPION HYDROCHLORIDE 150 MG/1
150 TABLET ORAL EVERY MORNING
Qty: 30 TABLET | Refills: 1 | Status: SHIPPED | OUTPATIENT
Start: 2025-05-22 | End: 2025-05-22

## 2025-05-22 RX ORDER — QUETIAPINE 300 MG/1
300 TABLET, FILM COATED, EXTENDED RELEASE ORAL EVERY EVENING
Qty: 30 TABLET | Refills: 1 | Status: SHIPPED | OUTPATIENT
Start: 2025-05-22

## 2025-05-22 RX ORDER — QUETIAPINE 300 MG/1
300 TABLET, FILM COATED, EXTENDED RELEASE ORAL EVERY EVENING
Qty: 30 TABLET | Refills: 1 | Status: SHIPPED | OUTPATIENT
Start: 2025-05-22 | End: 2025-05-22

## 2025-05-22 RX ORDER — BUPROPION HYDROCHLORIDE 150 MG/1
150 TABLET ORAL EVERY MORNING
Qty: 30 TABLET | Refills: 1 | Status: SHIPPED | OUTPATIENT
Start: 2025-05-22

## 2025-05-22 RX ADMIN — BUPROPION HYDROCHLORIDE 150 MG: 150 TABLET, EXTENDED RELEASE ORAL at 09:04

## 2025-05-22 NOTE — PROGRESS NOTES
Progress Note  Date:2025      Room:[facility]  Patient Name:Sharif Mcnair     YOB: 2000     Age:25 y.o.        Subjective    Subjective patient has been compliant with her medications.  She is making slow progress.  Still presents labile anxious paranoid, guarded.  Continues to have impaired insight into her hospitalizations and need for mental health treatment.  Patient has been mostly isolated to herself, Patient remains guarded, paranoid hearing voices but minimizes.    Mental status examination-   No acute distress noted as per review of chart patient still hearing voices which are loud.  No reported active suicidal or homicidal statements no reported active command hallucinations to harm self or others.  Insight judgment coping remains impaired.  Review of Systems  Objective         Vitals Last 24 Hours:  TEMPERATURE:  Temp  Av.1 °F (36.7 °C)  Min: 98.1 °F (36.7 °C)  Max: 98.1 °F (36.7 °C)  RESPIRATIONS RANGE: Resp  Av  Min: 16  Max: 16  PULSE OXIMETRY RANGE: SpO2  Av %  Min: 100 %  Max: 100 %  PULSE RANGE: Pulse  Av  Min: 66  Max: 66  BLOOD PRESSURE RANGE: Systolic (24hrs), Av , Min:112 , Max:112   ; Diastolic (24hrs), Av, Min:70, Max:70    I/O (24Hr):  No intake or output data in the 24 hours ending 25  Objective:  Vital signs: (most recent): Blood pressure 112/70, pulse 66, temperature 98.1 °F (36.7 °C), temperature source Oral, resp. rate 16, height 1.626 m (5' 4\"), weight 68 kg (150 lb), last menstrual period 05/15/2025, SpO2 100%.      Labs/Imaging/Diagnostics    Labs:  CBC:  No results for input(s): \"WBC\", \"RBC\", \"HGB\", \"HCT\", \"MCV\", \"RDW\", \"PLT\" in the last 72 hours.    CHEMISTRIES:  No results for input(s): \"NA\", \"K\", \"CL\", \"CO2\", \"BUN\", \"CREATININE\", \"GLUCOSE\", \"PHOS\", \"MG\" in the last 72 hours.    Invalid input(s): \"CA\"    PT/INR:No results for input(s): \"PROTIME\", \"INR\" in the last 72 hours.  APTT:No results for input(s): \"APTT\" in the last

## 2025-05-22 NOTE — GROUP NOTE
Group Therapy Note    Date: 5/22/2025    Group Start Time: 1115  Group End Time: 1145  Group Topic: Education Group - Inpatient    SSR 2  NON ACUTE    Stephanie Rosales        Group Therapy Note    Setting Goals/Facilitated discussion focused on “stepping up to a better you” by taking steps to improve in their well-being and identifying goals that would help to ensure follow-up       Attendees: 614       Notes:  Encouraged but did not attend    Discipline Responsible: Recreational Therapist      Signature:  DANITZA Arreaga

## 2025-05-22 NOTE — CARE COORDINATION
05/22/25 0859   ITP   Date of Plan 05/22/25   Primary Diagnosis Code Principal Psychosis, unspecified psychosis type (HCC) F29   Plan of Care   Long Term Goal (LTG) Stated in patient/guardian terms Pt will actively participate in treatment   Short Term Goal 1   Short Term Goal 1 Client will be oriented to program and staff, and participate in assessment process   STG Goal 1 Status: Patient Appears to be  Goal has been met, formulating new goal   Short Term Goal 2   Short Term Goal 2 Client will maintain compliance with medication regime   STG Goal 2 Status: Patient Appears to be  Goal has been met, formulating new goal

## 2025-05-22 NOTE — PROGRESS NOTES
Progress Note  Date:2025       Room:[facility] [place]  Patient Name:Sharif Mcnair     YOB: 2000     Age:25 y.o.        Subjective    Subjective patient has been making slow progress with her depression, her mood, sami.  She has been engaging in few therapeutic group sessions.  Patient has been compliant with medications.  Denies having any active suicidal or homicidal ideations.  No reported command hallucinations.    Mental status examination-    Patient is alert, oriented x3   Speech is coherent, moderate volume, no flight of ideas, no loosening of associations.  Casually dressed and groomed  No Active suicidal ideations, plan or intent.  No active homicidal ideations plan or intent  No command hallucinations  Thought Processes linear  Not seen responding to any active internal stimuli  No persecutory delusions  Insight limited  Judgement limited       Review of Systems  Objective         Vitals Last 24 Hours:  TEMPERATURE:  Temp  Av.1 °F (36.7 °C)  Min: 98.1 °F (36.7 °C)  Max: 98.1 °F (36.7 °C)  RESPIRATIONS RANGE: Resp  Av  Min: 16  Max: 16  PULSE OXIMETRY RANGE: SpO2  Av %  Min: 100 %  Max: 100 %  PULSE RANGE: Pulse  Av  Min: 66  Max: 66  BLOOD PRESSURE RANGE: Systolic (24hrs), Av , Min:112 , Max:112   ; Diastolic (24hrs), Av, Min:70, Max:70    I/O (24Hr):  No intake or output data in the 24 hours ending 25 2135  Objective  Labs/Imaging/Diagnostics    Labs:  CBC:No results for input(s): \"WBC\", \"RBC\", \"HGB\", \"HCT\", \"MCV\", \"RDW\", \"PLT\" in the last 72 hours.  CHEMISTRIES:No results for input(s): \"NA\", \"K\", \"CL\", \"CO2\", \"BUN\", \"CREATININE\", \"GLUCOSE\", \"PHOS\", \"MG\" in the last 72 hours.    Invalid input(s): \"CA\"  PT/INR:No results for input(s): \"PROTIME\", \"INR\" in the last 72 hours.  APTT:No results for input(s): \"APTT\" in the last 72 hours.  LIVER PROFILE:No results for input(s): \"AST\", \"ALT\", \"BILIDIR\", \"BILITOT\", \"ALKPHOS\" in the last 72 hours.    Imaging Last 24

## 2025-05-22 NOTE — PLAN OF CARE
Problem: Anxiety  Goal: Will report anxiety at manageable levels  Description: INTERVENTIONS:1. Administer medication as ordered2. Teach and rehearse alternative coping skills3. Provide emotional support with 1:1 interaction with staff  5/16/2025 1610 by Genesis Ferreira RN  Outcome: Progressing  5/16/2025 1610 by Genesis Ferreira RN  Outcome: Progressing     Problem: Decision Making  Goal: Pt/Family able to effectively weigh alternatives and participate in decision making related to treatment and care  Description: INTERVENTIONS:1. Determine when there are differences between patient's view, family's view, and healthcare provider's view of condition2. Facilitate patient and family articulation of goals for care3. Help patient and family identify pros/cons of alternative solutions4. Provide information as requested by patient/family5. Respect patient/family right to receive or not to receive information6. Serve as a liaison between patient and family and health care team7. Initiate Consults from Ethics, Palliative Care or initiate Family Care Conference as is appropriate  5/16/2025 1610 by Genesis Ferreira RN  Outcome: Progressing  5/16/2025 1610 by Genesis Ferreira RN  Outcome: Progressing     Problem: Behavior  Goal: Pt/Family maintain appropriate behavior and adhere to behavioral management agreement, if implemented  Description: INTERVENTIONS:1. Assess patient/family's coping skills and  non-compliant behavior (including use of illegal substances)2. Notify security of behavior or suspected illegal substances which indicate the need for search of the family and/or belongings3. Encourage verbalization of thoughts and concerns in a socially appropriate manner4. Utilize positive, consistent limit setting strategies supporting safety of patient, staff and others5. Encourage participation in the decision making process about the behavioral management agreement6. If a visitor's 
  Problem: Anxiety  Goal: Will report anxiety at manageable levels  Description: INTERVENTIONS:1. Administer medication as ordered2. Teach and rehearse alternative coping skills3. Provide emotional support with 1:1 interaction with staff  Outcome: Progressing     Problem: Coping  Goal: Pt/Family able to verbalize concerns and demonstrate effective coping strategies  Description: INTERVENTIONS:1. Assist patient/family to identify coping skills, available support systems and cultural and spiritual values2. Provide emotional support, including active listening and acknowledgement of concerns of patient and caregivers3. Reduce environmental stimuli, as able4. Instruct patient/family in relaxation techniques, as appropriate5. Assess for spiritual pain/suffering and initiate Spiritual Care, Psychosocial Clinical Specialist consults as needed  Outcome: Progressing     Problem: Decision Making  Goal: Pt/Family able to effectively weigh alternatives and participate in decision making related to treatment and care  Description: INTERVENTIONS:1. Determine when there are differences between patient's view, family's view, and healthcare provider's view of condition2. Facilitate patient and family articulation of goals for care3. Help patient and family identify pros/cons of alternative solutions4. Provide information as requested by patient/family5. Respect patient/family right to receive or not to receive information6. Serve as a liaison between patient and family and health care team7. Initiate Consults from Ethics, Palliative Care or initiate Family Care Conference as is appropriate  Outcome: Progressing     Problem: Behavior  Goal: Pt/Family maintain appropriate behavior and adhere to behavioral management agreement, if implemented  Description: INTERVENTIONS:1. Assess patient/family's coping skills and  non-compliant behavior (including use of illegal substances)2. Notify security of behavior or suspected illegal substances 
  Problem: Anxiety  Goal: Will report anxiety at manageable levels  Description: INTERVENTIONS:1. Administer medication as ordered2. Teach and rehearse alternative coping skills3. Provide emotional support with 1:1 interaction with staff  Outcome: Progressing     Problem: Decision Making  Goal: Pt/Family able to effectively weigh alternatives and participate in decision making related to treatment and care  Description: INTERVENTIONS:1. Determine when there are differences between patient's view, family's view, and healthcare provider's view of condition2. Facilitate patient and family articulation of goals for care3. Help patient and family identify pros/cons of alternative solutions4. Provide information as requested by patient/family5. Respect patient/family right to receive or not to receive information6. Serve as a liaison between patient and family and health care team7. Initiate Consults from Ethics, Palliative Care or initiate Family Care Conference as is appropriate  Outcome: Progressing     Problem: Coping  Goal: Pt/Family able to verbalize concerns and demonstrate effective coping strategies  Description: INTERVENTIONS:1. Assist patient/family to identify coping skills, available support systems and cultural and spiritual values2. Provide emotional support, including active listening and acknowledgement of concerns of patient and caregivers3. Reduce environmental stimuli, as able4. Instruct patient/family in relaxation techniques, as appropriate5. Assess for spiritual pain/suffering and initiate Spiritual Care, Psychosocial Clinical Specialist consults as needed  Outcome: Progressing     
  Problem: Chronic Conditions and Co-morbidities  Goal: Patient's chronic conditions and co-morbidity symptoms are monitored and maintained or improved  Outcome: Progressing     Problem: Discharge Planning  Goal: Discharge to home or other facility with appropriate resources  Outcome: Progressing     Problem: Risk for Elopement  Goal: Patient will not exit the unit/facility without proper excort  Outcome: Progressing     
Problem: Anxiety  Goal: Will report anxiety at manageable levels  Description: INTERVENTIONS:  1. Administer medication as ordered  2. Teach and rehearse alternative coping skills  3. Provide emotional support with 1:1 interaction with staff  5/17/2025 2221 by Zo Teran, RN  Outcome: Progressing    Pt has been isolative to her room.   Declined scheduled Remeron, education and encouragement provided.   Pt denies SI/HI/AVH.   
Problem: Anxiety  Goal: Will report anxiety at manageable levels  Description: INTERVENTIONS:  1. Administer medication as ordered  2. Teach and rehearse alternative coping skills  3. Provide emotional support with 1:1 interaction with staff  5/18/2025 2224 by Zo Teran, RN  Outcome: Progressing    Pt has been isolative to room.  Pt denies SI/HI/AVH.  Compliant with medications.   
Sharif is AOX4,ambulatory,calm and cooperative but anxious and tearful sometimes.She is able to voice her concerns. She took her HS snacks and medications.Denies SI/HI/AVH and pains.No distress observed.Plan of care is ongoing.  
Ethics, Palliative Care or initiate Family Care Conference as is appropriate  Outcome: Progressing     Problem: Behavior  Goal: Pt/Family maintain appropriate behavior and adhere to behavioral management agreement, if implemented  Description: INTERVENTIONS:1. Assess patient/family's coping skills and  non-compliant behavior (including use of illegal substances)2. Notify security of behavior or suspected illegal substances which indicate the need for search of the family and/or belongings3. Encourage verbalization of thoughts and concerns in a socially appropriate manner4. Utilize positive, consistent limit setting strategies supporting safety of patient, staff and others5. Encourage participation in the decision making process about the behavioral management agreement6. If a visitor's behavior poses a threat to safety call refer to organization policy.7. Initiate consult with , Psychosocial CNS, Spiritual Care as appropriate  Outcome: Progressing     Problem: Depression/Self Harm  Goal: Effect of psychiatric condition will be minimized and patient will be protected from self harm  Description: INTERVENTIONS:1. Assess impact of patient's symptoms on level of functioning, self care needs and offer support as indicated2. Assess patient/family knowledge of depression, impact on illness and need for teaching3. Provide emotional support, presence and reassurance4. Assess for possible suicidal thoughts or ideation. If patient expresses suicidal thoughts or statements do not leave alone, initiate Suicide Precautions, move to a room close to the nursing station and obtain sitter5. Initiate consults as appropriate with Mental Health Professional, Spiritual Care, Psychosocial CNS, and consider a recommendation to the [facility] for a Psychiatric Consultation  Outcome: Progressing     
impact on illness and need for teaching3. Provide emotional support, presence and reassurance4. Assess for possible suicidal thoughts or ideation. If patient expresses suicidal thoughts or statements do not leave alone, initiate Suicide Precautions, move to a room close to the nursing station and obtain sitter5. Initiate consults as appropriate with Mental Health Professional, Spiritual Care, Psychosocial CNS, and consider a recommendation to the [facility] for a Psychiatric Consultation  5/21/2025 0034 by Christine Cotres, RN  Outcome: Progressing  5/20/2025 1058 by Linda Boone, RN  Outcome: Progressing

## 2025-05-22 NOTE — BH NOTE
Patient alert and oriented. She was isolative today during group she stayed in her room. Patient reported depression 5/10  and Anxiety 3/10. Denies SI, HI, auditory and visual hallucinations. Patient administered PRN medications for anxiety and sleep. She will continued to be monitored every 15 minutes.    
Behavioral Health Transition Record to Provider    Patient Name: Sharif Mcnair  YOB: 2000  Medical Record Number: [id number]  Date of Admission: 5/15/2025  Date of Discharge: 5/22/2025    Attending Provider: Carmen Fong MD  Discharging Provider: Dr. Fong  To contact this individual call [phone] and ask the  to page.  If unavailable, ask to be transferred to Behavioral Health Provider on call.  A Behavioral Health Provider will be available on call 24/7 and during holidays.    Primary Care Provider: No primary care provider on file.    No Known Allergies    Reason for Admission: Pt reported having SI without a plan and family shared that pt has been acting different the past few days. Per prescreen-pt has not been eating, sleeping or taking showers. Pt appeared to be internally stimulated while being assessed in the ED.Pt has been non-compliant with medications ( Mirtazipine 7.5 mg tablet once at bedtime, Citalopram 20 mg once daily, Bupropion 150 mg once daily, Hydroxyzine 25 mg 3 times daily as needed for anxiety. Pt has hx of bi-polar dx     Admission Diagnosis: Suicidal ideation [R45.851]  Psychosis, unspecified psychosis type (HCC) [F29]  Major depression, recurrent [F33.9]    * No surgery found *    Results for orders placed or performed during the hospital encounter of 05/15/25   COVID-19 & Influenza Combo    Specimen: Nasopharyngeal   Result Value Ref Range    SARS-CoV-2, PCR Not Detected Not Detected      Rapid Influenza A By PCR Not Detected Not Detected      Rapid Influenza B By PCR Not Detected Not Detected     CBC with Auto Differential   Result Value Ref Range    WBC 7.2 3.6 - 11.0 K/uL    RBC 4.02 3.80 - 5.20 M/uL    Hemoglobin 13.1 11.5 - 16.0 g/dL    Hematocrit 36.1 35.0 - 47.0 %    MCV 89.8 80.0 - 99.0 FL    MCH 32.6 26.0 - 34.0 PG    MCHC 36.3 30.0 - 36.5 g/dL    RDW 11.6 11.5 - 14.5 %    Platelets 275 150 - 400 K/uL    MPV 10.1 8.9 - 12.9 FL    Nucleated RBCs 0.0 0.0 
Behavioral Health Treatment Team Note     Patient goal(s) for today: To visit with Tamika (former foster mom she lives with)  Treatment team focus/goals: Medication management, group therapy, discharge planning    Progress note: Pt presents dysphoric with sad/mad affect. She is tearful stating \"this is traumatic being handcuffed and being in a place like this.\" Pt states she isolates to her room because she can't carry on a conversation with the people on the unit. She says they remind her of people she sees in the news. Writer redirected pt to what brought her to the hospital and how treatment is designed to stabilize and empower her to make healthy decisions/group therapy. Pt states she will make more of an effort to go to groups. Pt is connected to [facility] and has had about 3 therapy sessions so far. She is receptive to therapy. She loves her boyfriend and identifies him as her only strength at this time. Her negative core value is \"I'm worthless\". Writer assigned homework to challenge negative thoughts and provided guidance for her journal. Pt visited with her former foster mother. She is very supportive. The visit was therapeutic. An inpatient level of care is needed to further stabilize pt on medication.     LOS:  4  Expected LOS: 5-7 days    Insurance info/prescription coverage:  [employer]   Date of last family contact:  5/20 with foster mom  Family requesting physician contact today:  No  Discharge plan:  Resume out patient services through [facility]  Guns in the home:  No  Outpatient provider(s):  [facility]    Participating treatment team members: SHANTEL Mccord MSW  
Behavioral Health Treatment Team Note     Patient goal(s) for today: To visit with boyfriend  Treatment team focus/goals: Medication management, group therapy, discharge planning    Progress note: PT presents calm and cooperative with sad affect. She denies SI/HI/AVH and reports feeling \"ok\". Pt's demeanor is negative. She is holding on to anger regarding the way she was brought to the  hospital (police/handcuffed to hosp bed).  Pt is very attached to her boyfriend. He is \"extra supportive\" and has promised her a day of shopping, eating and fun post discharge.     LOS:  5  Expected LOS: 5-7 days    Insurance info/prescription coverage:  [employer]  Date of last family contact:  5/21/2025  Family requesting physician contact today:  No  Discharge plan:  Resume out patient services with [facility]  Guns in the home:  No  Outpatient provider(s):  [facility]    Participating treatment team members: SHANTEL Mccodr MSW  
Behavioral Health Treatment Team Note     Patient goal(s) for today: to rest  Treatment team focus/goals: Medication management, group therapy, discharge planning    Progress note: Pt presents in bed sleepy and oriented. She's calm and cooperative, pleasant demeanor. Soft spoken. Denies SI/HI/AVH. Writer informed pt that her former foster mom, Ms. Lundberg and her boyfriend, Brad were interested in visiting. Pt is motivated to have visitors. Tamika will come on Tuesday at 5pm and Brad will come on Wednesday at noon. Tamika shared that pt is living with her and she's helping her get back on her feet as an adult. An inpatient level of care is needed to further stabilize pt on medication.     LOS:  3  Expected LOS: TDO expires 5/28/25    Insurance info/prescription coverage:  [employer]  Date of last family contact:  5/19/25 with Tamika Lebron (foster mom) [phone] Brad Yung (boyfriend) [phone]   Family requesting physician contact today:  No  Discharge plan:  Stabilize and connect to resources  Guns in the home:  No  Outpatient provider(s):   [facility] (Macarena Gomez) and Dr. Culp     Participating treatment team members: Sharif Mcnair, DONY PATEL  
DAY SHIFT NOTE:    Pt laying in bed with eyes closed, respirations even and unlabored.  Pt presents with flat affect.  Pt initially cooperative and friendly, but became labile throughout assessment.  When pt asked about anxiety and depression pt states \"how can I, I just woke up\".  Pt denies SI, HI, AH, and VH.  When morning medication was administered took all pills except 1 tablet of Seroquel.  When pt told she had 1 tablet left pt shrugged shoulders and stated \"I ran out of water\".  Pt asked if she had water in pitcher, pt then snatched up water picture stating \"I will take me medicine\".  Pt up late for breakfast and received tray that was saved for pt.  Pt remains on Q 15 minutes close observation for safety.    
DISCHARGE SUMMARY    NAME:Sharif Mcnair  : 2000  MRN: [id number]    The patient Sharif Mcnair exhibits the ability to control behavior in a less restrictive environment.  Patient's level of functioning is improving.  No assaultive/destructive behavior has been observed for the past 24 hours.  No suicidal/homicidal threat or behavior has been observed for the past 24 hours.  There is no evidence of serious medication side effects.  Patient has not been in physical or protective restraints for at least the past 24 hours.    If weapons involved, how are they secured? N/a    Is patient aware of and in agreement with discharge plan? Yes    Arrangements for medication:  Prescriptions to pharmacy in chart    Copy of discharge instructions to provider?:  n/a    Arrangements for transportation home:  PT's boyfriend will pick her up    Keep all follow up appointments as scheduled, continue to take prescribed medications per physician instructions.  Mental health crisis number:  [phone]      Mental Health Emergency WARM LINE      [phone] ([phone])      M-F: 9am to 9pm      Sat & Sun: 5pm - 9pm  National suicide prevention lines:                             9-951-KUJCTXZ (6-836-108-8405)       0-054-289-TALK ([phone])    Crisis Text Line:  Text HOME to [phone]  
DISCHARGE SUMMARY    NAME:Sharif Mcnair  : 2000  MRN: [id number]    The patient Sharif Mcnair exhibits the ability to control behavior in a less restrictive environment.  Patient's level of functioning is improving.  No assaultive/destructive behavior has been observed for the past 24 hours.  No suicidal/homicidal threat or behavior has been observed for the past 24 hours.  There is no evidence of serious medication side effects.  Patient has not been in physical or protective restraints for at least the past 24 hours.    If weapons involved, how are they secured? N/a    Is patient aware of and in agreement with discharge plan? Yes    Arrangements for medication:  Prescriptions to pharmacy in chart    Copy of discharge instructions to provider?:  n/a    Arrangements for transportation home:  PT's boyfriend will pick her up    Keep all follow up appointments as scheduled, continue to take prescribed medications per physician instructions.  Mental health crisis number:  [phone]      Mental Health Emergency WARM LINE      [phone]-MHAV ([phone])      M-F: 9am to 9pm      Sat & Sun: 5pm - 9pm  National suicide prevention lines:                             4-012-JGROOGD ([phone])       9-753-832-TALK ([phone])    Crisis Text Line:  Text HOME to 405489  
HEARING DISPOSITION    Special Justice: Judge Gillespie  : Ms. Wolfe   Committed: 10 Days  Expires: 5/28/2025   
PSA PART II ADDITIONAL INFORMATION        Access To Fire Arms: No    Substance Use: Yes    Last Use: unknown due to pts mental status    Type of Substance: alcohol and amphetamines    Frequency of Use: unknown due to pts mental status    Request to See : No    If yes, notified : No    Guardian:No    Guardian Contact:Pt is her own legal guardian     Release of Information Signed: No    Release of Information Signed For: pt provided verbal authorization for boyfriend and foster mom in the ED but writer was not able to obtain due to mental status/sleeping      
Patient is alert and oriented x4. She is isolative to her room. She reported 5/10 anxiety due to her being discharged tomorrow . This nurse advised patient this is normal but she will need to keep up with her therapy appointments to help her continue to practice coping skills. This nurse asked patient to state a few coping skills she learned . Patient stated a few but she said she felt like she missed a lot of important information missing group therapy. Patient reported depression 5/10, denies SI, HI,  and AH. Patient opened up to this nurse stating\" I have seen shadows in my room\". She could not identify the shadows she stated\" sometimes I just look up and my mind will create something\". She was not currently experiencing visual hallucinations .Patient to scheduled medications and PRN's. Will continue to monitor for every 15 min checks.   
Patient remained isolated to room. Denied everything. Stayed in bed. Medication compliant. A&OX4. Flat affect. Remains on q 15 minutes checks for safety.  
Pt up ad landon on unit, mostly isolative to room but has eaten some meals in the dayroom and attended group therapy briefly. Pt presented initially as labile, refusing scheduled labwork. Writer explained importance of labwork and pt told this writer that she would accept labs to be redrawn later. Pt accepted scheduled wellbutrin and seroquel but refused K+ 40meq and bactrim due to pills being too big. Writer notified Melonie DYKES. Pt reports feeling frustrated, presents as tearful. Pt reports depression and anxiety 7/10. Pt reports AH of \"them saying names loudly.\" Pt denies command AH. Pt denied pain or any physical complaints. AM VS WNL- /81   Pulse 76   Temp 98.4 °F (36.9 °C) (Oral)   Resp 16   Ht 1.626 m (5' 4\")   Wt 68 kg (150 lb)   LMP 05/15/2025   SpO2 99%   BMI 25.75 kg/m²   Q 15 min rounding continued to ensure pt safety on unit.  
Pt up ad landon on unit. Pt is cooperative and pleasant. Pt takes medication without difficulty. Pt denies depression and anxiety. Pt denies SI/HI. Pt denies AVH. Pt isolative to room majority of shift. Pt takes medication without difficulty. Pt stated her visitation was \"so so.\" Close observations continued to ensure pt safety.   
Pt up ad landon on unit. Pt presents with flat affect but smiles when talking with this writer. Pt is isolative to room majority of shift and only up for meals. Pt takes medications without difficulty. Pt stated she had no depression today and her anxiety just started as she woke up. Pt denies SI/HI. Pt denies AVH. Pt not attending groups or interacting much with peers. Close observations continued to ensure pt safety.   
Pt. Is up and visible on unit, isolates to room, denies feeling suicidal or depressed, denies hallucinations, accepts medications as ordered,pt.has been discharged to home with all personal belongings,discharge instructions for follow up,and prescriptions sent to pharmacy,pt.verbalizes understanding,pt.escorted off unit via staff to be taken home via boyfriend,  
Pt.is lying quietly in bed at present time,denies feeling SI or HI.isolates to room at present time, pleasant upon approach, accepted medications as ordered, insight and judgement is limited,no other concerns voiced.remains on close observation.  
This 25 year old white female is being admitted to Behavioral Health under the professional services of  with an admitting diagnosis of  psychosis.Client is pleasant andcooperative.Appearance is untidy. Client reports that family told her she had been acting crazy for a few days.Client denies feeling suicidal or homicidal.Admits to past history of suicide attempt but would not comment on what she did.She denies a substance abuse.She does admit to drinking daily . She reports a history of aggression.Denies auditory hallucinations.Readily admits that she has been non- complaint with meds that have been prescribed for her.She does see a therapist but has not done so for a while.Per the chart, client told police that she was suicidal but denies it now.Admitted to being upset because she did not want to be TDO'D and told staff but no one listened.Reports that she went to a friend\"s house and did some things that she was not suppose to and that started her problems.Per prescreening form,client has not been not eating  and poor sleep for 4 dfays and experincing internal stimuli as well as threatening suicide by cutting.Client was grabbing her breast and talking about nipple piercing.Client was then bought in to the ER.She will be placed on close observation for safety.  
 WBC    nRBC 0.00 0.00 - 0.01 K/uL    Neutrophils % 67.0 32.0 - 75.0 %    Lymphocytes % 25.0 12.0 - 49.0 %    Monocytes % 6.8 5.0 - 13.0 %    Eosinophils % 0.6 0.0 - 7.0 %    Basophils % 0.3 0.0 - 1.0 %    Immature Granulocytes % 0.3 0 - 0.5 %    Neutrophils Absolute 4.83 1.80 - 8.00 K/UL    Lymphocytes Absolute 1.80 0.80 - 3.50 K/UL    Monocytes Absolute 0.49 0.00 - 1.00 K/UL    Eosinophils Absolute 0.04 0.00 - 0.40 K/UL    Basophils Absolute 0.02 0.00 - 0.10 K/UL    Immature Granulocytes Absolute 0.02 0.00 - 0.04 K/UL    Differential Type AUTOMATED     Comprehensive Metabolic Panel   Result Value Ref Range    Sodium 135 (L) 136 - 145 mmol/L    Potassium 3.3 (L) 3.5 - 5.1 mmol/L    Chloride 104 97 - 108 mmol/L    CO2 25 21 - 32 mmol/L    Anion Gap 6 2 - 12 mmol/L    Glucose 98 65 - 100 mg/dL    BUN 11 6 - 20 mg/dL    Creatinine 0.67 0.55 - 1.02 mg/dL    BUN/Creatinine Ratio 16 12 - 20      Est, Glom Filt Rate >90 >60 ml/min/1.73m2    Calcium 9.2 8.5 - 10.1 mg/dL    Total Bilirubin 0.5 0.2 - 1.0 mg/dL    AST 16 15 - 37 U/L    ALT 24 12 - 78 U/L    Alk Phosphatase 68 45 - 117 U/L    Total Protein 7.9 6.4 - 8.2 g/dL    Albumin 4.0 3.5 - 5.0 g/dL    Globulin 3.9 2.0 - 4.0 g/dL    Albumin/Globulin Ratio 1.0 (L) 1.1 - 2.2     Ethanol   Result Value Ref Range    Ethanol Lvl <10 <10 mg/dL   Urine Drug Screen   Result Value Ref Range    Amphetamine, Urine Positive (A) Negative      Barbiturates, Urine Negative Negative      Benzodiazepines, Urine Negative Negative      Cocaine, Urine Negative Negative      Methadone, Urine Negative Negative      Opiates, Urine Negative Negative      Phencyclidine, Urine Negative Negative      THC, TH-Cannabinol, Urine Negative Negative      Comments:        This test is a screen for drugs of abuse in a medical setting only (i.e., they are unconfirmed results and as such must not be used for non-medical purposes, e.g.,employment testing, legal testing). Due to its inherent nature, 
insight and does not take her medications     Family constellation: Pt lives with her foster mother and has a boyfriend with no children     Is significant other involved? Yes    Describe support system: Pts foster mother and boyfriend     Describe living arrangements and home environment: Pt lives with foster mother    GUARDIAN/POA: No    Guardian Name: Pt is her own legal guardian     Guardian Contact: n/a    Health issues:     Trauma history: unknown    Legal issues: unknown     History of  service: Pt does not have hx of  service     Financial status: unknown     Advent/cultural factors: unknown    Education/work history: 11th grade    Have you been licensed as a health care professional (current or ): Pt does not have a license in the health care profession    Describe coping skills:maladaptive     Mackenzie Riggins  2025

## 2025-05-22 NOTE — GROUP NOTE
Group Therapy Note    Date: 5/21/2025    Group Start Time: 1950  Group End Time: 2045  Group Topic: Recreational    SSR 2 BH NON ACUTE    Elana Fang        Group Therapy Note    Attendees: 8/12    Recreational Therapist facilitated structured leisure skills group to introduce healthy leisure skills as positive way to cope and manage mood.         Patient's Goal:  Client will be oriented to program and staff, and participate in assessment process     Notes:  Initially attended group however left group after a few minutes and did not return.    Status After Intervention:  Unchanged    Participation Level: Minimal    Participation Quality: Appropriate      Speech:  normal      Thought Process/Content: Logical      Affective Functioning: Congruent      Mood:  calm       Level of consciousness:  Alert      Response to Learning: Progressing to goal      Endings: None Reported    Modes of Intervention: Activity      Discipline Responsible: Recreational Therapist      Signature:  DANITZA Yanes

## 2025-05-22 NOTE — DISCHARGE SUMMARY
PSYCHIATRIC DISCHARGE SUMMARY         IDENTIFICATION:    Patient Name  Sharif Mcnair   Date of Birth 2000   [facility] [id number]   Medical Record Number  [id number]      Age  25 y.o.   PCP No primary care provider on file.   Admit date:  5/15/2025    Discharge date: 5/22/2025   Room Number  238/02  @ [facility]   Date of Service  5/22/2025            TYPE OF DISCHARGE: REGULAR               CONDITION AT DISCHARGE: Stable       PROVISIONAL & DISCHARGE DIAGNOSES:         Psychosis, unspecified psychosis type (HCC)  Bipolar disorder, mixed with most recent episode depression with suicidal ideation  UDS positive for amphetamines     CC & HISTORY OF PRESENT ILLNESS:    CC: \"I have been told acting crazy for 4 days\" \"well I ended up going to a friend's house got some stuff I was not supposed to get\".  Bizarre behavior and disorganized.  Suicidal ideation            HISTORY OF PRESENT ILLNESS:      The patient, Sharif Mcnair, is a 25 y.o. female admitted to the behavioral health floor after patient presents to the emergency department with disorganized bizarre behavior and statements.  Patient with history of bipolar disorder who presents with suicidal ideation and irritability.  Patient presents with the above chief complaint.  Information is as obtained from review of electronic records, talking to the patient and behavioral health staff.  Patient presented to the emergency department on 5/15/2025.  Law enforcement was contacted due to patient exhibiting bizarre behavior and making unusual statements as per the foster parents and boyfriend.  When police arrived patient had made suicidal statements.  Patient is currently under TDO.  Patient reported to have been minimizing her suicidal statements to the police.  She believes she had she was laced.  She expresses that she can \"only remember bits and pieces\".     Patient this morning of assessment continued to present anxious actively responding to

## 2025-05-22 NOTE — GROUP NOTE
Group Therapy Note    Date: 5/22/2025    Group Start Time: 0900  Group End Time: 0930  Group Topic: Group Therapy    SSR 2 [facility]    Charity Oden        Group Therapy Note    Attendees: 4       Patient's Goal:  did not participate     Notes:  none      Discipline Responsible: Behavorial Health Tech      Signature:  Charity Oden

## 2025-08-31 ENCOUNTER — HOSPITAL ENCOUNTER (EMERGENCY)
Facility: HOSPITAL | Age: 25
Discharge: HOME | End: 2025-08-31
Attending: STUDENT IN AN ORGANIZED HEALTH CARE EDUCATION/TRAINING PROGRAM
Payer: MEDICAID

## 2025-08-31 VITALS
SYSTOLIC BLOOD PRESSURE: 134 MMHG | BODY MASS INDEX: 29.44 KG/M2 | RESPIRATION RATE: 18 BRPM | OXYGEN SATURATION: 96 % | HEIGHT: 62 IN | WEIGHT: 160 LBS | TEMPERATURE: 98.6 F | HEART RATE: 100 BPM | DIASTOLIC BLOOD PRESSURE: 82 MMHG

## 2025-08-31 DIAGNOSIS — J06.9 VIRAL UPPER RESPIRATORY TRACT INFECTION: Primary | ICD-10-CM

## 2025-08-31 LAB
FLUAV RNA SPEC QL NAA+PROBE: NOT DETECTED
FLUBV RNA SPEC QL NAA+PROBE: NOT DETECTED
SARS-COV-2 RNA RESP QL NAA+PROBE: NOT DETECTED
SOURCE: NORMAL

## 2025-08-31 PROCEDURE — 87636 SARSCOV2 & INF A&B AMP PRB: CPT

## 2025-08-31 PROCEDURE — 99283 EMERGENCY DEPT VISIT LOW MDM: CPT

## 2025-08-31 RX ORDER — GUAIFENESIN 600 MG/1
600 TABLET, EXTENDED RELEASE ORAL 2 TIMES DAILY
Qty: 30 TABLET | Refills: 0 | Status: SHIPPED | OUTPATIENT
Start: 2025-08-31 | End: 2025-09-15

## 2025-08-31 RX ORDER — ACETAMINOPHEN 500 MG
500 TABLET ORAL 4 TIMES DAILY PRN
Qty: 360 TABLET | Refills: 1 | Status: SHIPPED | OUTPATIENT
Start: 2025-08-31

## 2025-08-31 ASSESSMENT — PAIN SCALES - GENERAL: PAINLEVEL_OUTOF10: 0

## 2025-08-31 ASSESSMENT — PAIN - FUNCTIONAL ASSESSMENT: PAIN_FUNCTIONAL_ASSESSMENT: 0-10
